# Patient Record
Sex: MALE | Race: WHITE | NOT HISPANIC OR LATINO | Employment: FULL TIME | URBAN - NONMETROPOLITAN AREA
[De-identification: names, ages, dates, MRNs, and addresses within clinical notes are randomized per-mention and may not be internally consistent; named-entity substitution may affect disease eponyms.]

---

## 2017-02-01 ENCOUNTER — OFFICE VISIT (OUTPATIENT)
Dept: INTERNAL MEDICINE | Facility: CLINIC | Age: 37
End: 2017-02-01

## 2017-02-01 VITALS
SYSTOLIC BLOOD PRESSURE: 140 MMHG | HEART RATE: 77 BPM | HEIGHT: 73 IN | DIASTOLIC BLOOD PRESSURE: 70 MMHG | BODY MASS INDEX: 27.22 KG/M2 | OXYGEN SATURATION: 97 % | TEMPERATURE: 98.5 F | WEIGHT: 205.4 LBS

## 2017-02-01 DIAGNOSIS — Z98.84 GASTRIC BYPASS STATUS FOR OBESITY: ICD-10-CM

## 2017-02-01 DIAGNOSIS — D50.8 OTHER IRON DEFICIENCY ANEMIA: Primary | ICD-10-CM

## 2017-02-01 DIAGNOSIS — R53.82 CHRONIC FATIGUE: ICD-10-CM

## 2017-02-01 LAB
25(OH)D3 SERPL-MCNC: 11.2 NG/ML
FERRITIN SERPL-MCNC: 8 NG/ML (ref 22–322)
FOLATE SERPL-MCNC: 5.88 NG/ML (ref 3.2–20)
IRON 24H UR-MRATE: 14 MCG/DL (ref 50–175)
IRON SATN MFR SERPL: 3 % (ref 20–50)
TIBC SERPL-MCNC: 464 MCG/DL (ref 250–450)
VIT B12 BLD-MCNC: 213 PG/ML (ref 211–911)

## 2017-02-01 PROCEDURE — 99213 OFFICE O/P EST LOW 20 MIN: CPT | Performed by: FAMILY MEDICINE

## 2017-02-01 PROCEDURE — 36415 COLL VENOUS BLD VENIPUNCTURE: CPT | Performed by: FAMILY MEDICINE

## 2017-02-01 PROCEDURE — 82746 ASSAY OF FOLIC ACID SERUM: CPT | Performed by: FAMILY MEDICINE

## 2017-02-01 PROCEDURE — 83550 IRON BINDING TEST: CPT | Performed by: FAMILY MEDICINE

## 2017-02-01 PROCEDURE — 82306 VITAMIN D 25 HYDROXY: CPT | Performed by: FAMILY MEDICINE

## 2017-02-01 PROCEDURE — 83540 ASSAY OF IRON: CPT | Performed by: FAMILY MEDICINE

## 2017-02-01 PROCEDURE — 82728 ASSAY OF FERRITIN: CPT | Performed by: FAMILY MEDICINE

## 2017-02-01 PROCEDURE — 82607 VITAMIN B-12: CPT | Performed by: FAMILY MEDICINE

## 2017-02-01 NOTE — PROGRESS NOTES
Subjective   Jose Martin Granda is a 37 y.o. male.     History of Present Illness   History of iron deficiency since gastric bypass in December 2004. Has lost >200 lb. Having fatigue despite iron supplement. interested in hematology referral for possible iron infusions if still low levels this check. Here for labs today.    The following portions of the patient's history were reviewed and updated as appropriate: allergies, current medications, past family history, past medical history, past social history, past surgical history and problem list.    Review of Systems   Constitutional: Positive for fatigue. Negative for unexpected weight change.       Objective   Physical Exam   Constitutional: He is oriented to person, place, and time. Vital signs are normal. He appears well-developed and well-nourished. He is active. He does not appear ill.   HENT:   Head: Normocephalic and atraumatic.   Right Ear: Hearing normal.   Left Ear: Hearing normal.   Nose: Nose normal.   Eyes: EOM and lids are normal. Pupils are equal, round, and reactive to light.   Cardiovascular: Normal rate, regular rhythm and normal heart sounds.    Pulmonary/Chest: Effort normal and breath sounds normal.   Neurological: He is alert and oriented to person, place, and time. No cranial nerve deficit. Gait normal.   Skin: Skin is warm. He is not diaphoretic. No cyanosis. Nails show no clubbing.   Psychiatric: He has a normal mood and affect. His speech is normal and behavior is normal. Judgment and thought content normal.   Nursing note and vitals reviewed.    Lab Results   Component Value Date    IRON 10 (L) 05/23/2016    TIBC 426 05/23/2016    FERRITIN 8.00 (L) 09/16/2016       Assessment/Plan   Jose Martin was seen today for iron deficiency anemia.    Diagnoses and all orders for this visit:    Other iron deficiency anemia  -     Iron Profile; Future  -     Ferritin; Future    Gastric bypass status for obesity  -     Iron Profile; Future  -     Ferritin; Future  -      Vitamin D 25 Hydroxy; Future  -     Vitamin B12 & Folate; Future    Chronic fatigue  -     Vitamin D 25 Hydroxy; Future  -     Vitamin B12 & Folate; Future

## 2017-02-02 DIAGNOSIS — E55.9 VITAMIN D DEFICIENCY: ICD-10-CM

## 2017-02-02 DIAGNOSIS — D50.9 IRON DEFICIENCY ANEMIA, UNSPECIFIED IRON DEFICIENCY ANEMIA TYPE: Primary | ICD-10-CM

## 2017-02-02 RX ORDER — ERGOCALCIFEROL 1.25 MG/1
50000 CAPSULE ORAL WEEKLY
Qty: 6 CAPSULE | Refills: 0 | Status: SHIPPED | OUTPATIENT
Start: 2017-02-02 | End: 2018-04-17

## 2017-02-03 ENCOUNTER — TELEPHONE (OUTPATIENT)
Dept: INTERNAL MEDICINE | Facility: CLINIC | Age: 37
End: 2017-02-03

## 2017-02-03 DIAGNOSIS — Z98.84 GASTRIC BYPASS STATUS FOR OBESITY: ICD-10-CM

## 2017-02-03 DIAGNOSIS — D50.9 IRON DEFICIENCY ANEMIA, UNSPECIFIED IRON DEFICIENCY ANEMIA TYPE: Primary | ICD-10-CM

## 2017-02-03 NOTE — TELEPHONE ENCOUNTER
----- Message from Lissy Alvarez sent at 2/3/2017 11:27 AM EST -----  THE PATIENT SAID HE HAD A MISSED CALL FROM DR. MATOS. HE CAN BE REACHED [phone].

## 2017-02-03 NOTE — TELEPHONE ENCOUNTER
PATIENT STATES HE GOT A MESSAGE SAYING DR. MATOS COULD SHULTZ THE ORDERS INSTEAD OF GOING TO HEMATOLOGIST

## 2017-02-06 RX ORDER — FERUMOXYTOL 510 MG/17ML
510 INJECTION INTRAVENOUS ONCE
Qty: 15.08 ML | Refills: 1 | Status: SHIPPED | OUTPATIENT
Start: 2017-02-06 | End: 2017-02-06

## 2017-02-06 NOTE — TELEPHONE ENCOUNTER
Please inform the patient that the order has been written and faxed over to outpatient infusions.  There are to contact him regarding scheduling.  Please have him call us if there's any questions.

## 2017-02-17 ENCOUNTER — APPOINTMENT (OUTPATIENT)
Dept: ONCOLOGY | Facility: HOSPITAL | Age: 37
End: 2017-02-17

## 2017-02-23 ENCOUNTER — CONSULT (OUTPATIENT)
Dept: ONCOLOGY | Facility: CLINIC | Age: 37
End: 2017-02-23

## 2017-02-23 VITALS
BODY MASS INDEX: 26.91 KG/M2 | TEMPERATURE: 98.4 F | SYSTOLIC BLOOD PRESSURE: 131 MMHG | WEIGHT: 204 LBS | DIASTOLIC BLOOD PRESSURE: 75 MMHG | HEART RATE: 73 BPM | RESPIRATION RATE: 18 BRPM

## 2017-02-23 DIAGNOSIS — Z98.84 GASTRIC BYPASS STATUS FOR OBESITY: ICD-10-CM

## 2017-02-23 DIAGNOSIS — D50.8 OTHER IRON DEFICIENCY ANEMIA: Primary | ICD-10-CM

## 2017-02-23 PROCEDURE — 99204 OFFICE O/P NEW MOD 45 MIN: CPT | Performed by: INTERNAL MEDICINE

## 2017-02-23 RX ORDER — SODIUM CHLORIDE 9 MG/ML
250 INJECTION, SOLUTION INTRAVENOUS ONCE
Status: CANCELLED | OUTPATIENT
Start: 2017-03-07

## 2017-02-23 RX ORDER — SODIUM CHLORIDE 9 MG/ML
250 INJECTION, SOLUTION INTRAVENOUS ONCE
Status: CANCELLED | OUTPATIENT
Start: 2017-02-28

## 2017-02-23 NOTE — PROGRESS NOTES
CHIEF COMPLAINT: Iron deficiency anemia    Problem List:  1.)  Iron deficiency anemia  2.)   b12 deficiency  3.)  History of Jasmyne-en-Y gastric bypass surgery 2004      HISTORY OF PRESENT ILLNESS:  The patient is a 37 y.o. male, here for follow up on management of iron deficiency anemia.  He's been on oral iron for many months and his ferritin is still staying quite low with hemoglobin in the 11's an MCV in the 70s.  He is generally fatigued.  He has 6 children.  He lost a lot of weight since his Jasmyne-en-Y surgery in December 2004 and is kept the weight off.  Other than for general fatigue he has no complaints.  He is not noted any change in the color caliber consistency of his stools though they stay dark when he is on his oral iron but he's off that now as it has not been effective in getting his iron levels up.      Past Medical History   Diagnosis Date   • Allergic 12/4/04     Aspirin and anti-inflammatories   • Erectile dysfunction    • Essential hypertension    • Hyperlipidemia    • Iron deficiency anemia      Past Surgical History   Procedure Laterality Date   • Gastric bypass     • Bariatric surgery  12/1/04     Ru en y gastric bypass   • Tonsillectomy       When I was a kid       Allergies   Allergen Reactions   • Anti-Inflammatory Enzyme [Nutritional Supplements]      Anti inflammatory meds     • Aspirin        Family History and Social History reviewed and changed as necessary      REVIEW OF SYSTEM:   Review of Systems   Constitutional: Negative for appetite change, chills, diaphoresis, fatigue, fever and unexpected weight change.   HENT:   Negative for mouth sores, sore throat and trouble swallowing.    Eyes: Negative for icterus.   Respiratory: Negative for cough, hemoptysis and shortness of breath.    Cardiovascular: Negative for chest pain, leg swelling and palpitations.   Gastrointestinal: Negative for abdominal distention, abdominal pain, blood in stool, constipation, diarrhea, nausea and vomiting.    Endocrine: Negative for hot flashes.   Genitourinary: Negative for bladder incontinence, difficulty urinating, dysuria, frequency and hematuria.    Musculoskeletal: Negative for gait problem, neck pain and neck stiffness.   Skin: Negative for rash.   Neurological: Negative for dizziness, gait problem, headaches, light-headedness and numbness.   Hematological: Negative for adenopathy. Does not bruise/bleed easily.   Psychiatric/Behavioral: Negative for depression. The patient is not nervous/anxious.    All other systems reviewed and are negative.       PHYSICAL EXAM    Vitals:    02/23/17 1416   BP: 131/75   Pulse: 73   Resp: 18   Temp: 98.4 °F (36.9 °C)   TempSrc: Temporal Artery    Weight: 204 lb (92.5 kg)     Constitutional: Appears well-developed and well-nourished. No distress.   ECOG: (1) Restricted in physically strenuous activity, ambulatory and able to do work of light nature  HENT:   Head: Normocephalic.   Mouth/Throat: Oropharynx is clear and moist.   Eyes: Conjunctivae are normal. Pupils are equal, round, and reactive to light. No scleral icterus.   Neck: Neck supple. No JVD present. No thyromegaly present.   Cardiovascular: Normal rate, regular rhythm and normal heart sounds.    Pulmonary/Chest: Breath sounds normal. No respiratory distress.   Abdominal: Soft. Exhibits no distension and no mass. There is no hepatosplenomegaly. There is no tenderness. There is no rebound and no guarding.   Musculoskeletal:Exhibits no edema, tenderness or deformity.   Neurological: Alert and oriented to person, place, and time. Exhibits normal muscle tone.   Skin: No ecchymosis, no petechiae and no rash noted. Not diaphoretic. No cyanosis. Nails show no clubbing.   Psychiatric: Normal mood and affect.   Vitals reviewed.      Office Visit on 02/01/2017   Component Date Value Ref Range Status   • Ferritin 02/01/2017 8.00* 22.00 - 322.00 ng/mL Final   • Iron 02/01/2017 14* 50 - 175 mcg/dL Final   • TIBC 02/01/2017 464* 250  - 450 mcg/dL Final   • Iron Saturation 02/01/2017 3* 20 - 50 % Final   • 25 Hydroxy, Vitamin D 02/01/2017 11.2  ng/ml Final   • Folate 02/01/2017 5.88  3.20 - 20.00 ng/mL Final   • Vitamin B-12 02/01/2017 213  211 - 911 pg/mL Final       Assessment/Plan     1.  Iron deficiency anemia  2. B12 deficiency  3. History of Jasmyne-en-Y for weight reduction    Discussion: He has iron malabsorption as well as B12 deficiency.  We will check his methylmalonic acid but he will get with Dr. Ralph to get a B12 shot monthly as it's easier to get in and out of his office than my new office.  We will give him day 1 and day 8 Feraheme.  Risks including anaphylaxis were discussed and he consents.  We will see him back in about 3 months with iron panel and CBC just prior to return.       Errors in dictation may reflect use of voice recognition software and not all errors in transcription may have been detected prior to signing.    Nicolas Chino MD    02/23/2017

## 2017-02-28 ENCOUNTER — INFUSION (OUTPATIENT)
Dept: ONCOLOGY | Facility: HOSPITAL | Age: 37
End: 2017-02-28

## 2017-02-28 VITALS
DIASTOLIC BLOOD PRESSURE: 82 MMHG | RESPIRATION RATE: 18 BRPM | TEMPERATURE: 98.4 F | SYSTOLIC BLOOD PRESSURE: 132 MMHG | HEART RATE: 80 BPM

## 2017-02-28 DIAGNOSIS — D50.8 OTHER IRON DEFICIENCY ANEMIA: ICD-10-CM

## 2017-02-28 DIAGNOSIS — Z98.84 GASTRIC BYPASS STATUS FOR OBESITY: Primary | ICD-10-CM

## 2017-02-28 LAB
BASOPHILS # BLD AUTO: 0.03 10*3/MM3 (ref 0–0.2)
BASOPHILS NFR BLD AUTO: 0.8 % (ref 0–2.5)
DEPRECATED RDW RBC AUTO: 46.6 FL (ref 37–54)
EOSINOPHIL # BLD AUTO: 0.09 10*3/MM3 (ref 0–0.7)
EOSINOPHIL NFR BLD AUTO: 2.5 % (ref 0–7)
ERYTHROCYTE [DISTWIDTH] IN BLOOD BY AUTOMATED COUNT: 17.5 % (ref 11.5–14.5)
HCT VFR BLD AUTO: 37.7 % (ref 42–52)
HGB BLD-MCNC: 11.4 G/DL (ref 14–18)
IMM GRANULOCYTES # BLD: 0.01 10*3/MM3 (ref 0–0.06)
IMM GRANULOCYTES NFR BLD: 0.3 % (ref 0–0.6)
LYMPHOCYTES # BLD AUTO: 1 10*3/MM3 (ref 0.6–3.4)
LYMPHOCYTES NFR BLD AUTO: 27.5 % (ref 10–50)
MCH RBC QN AUTO: 22.7 PG (ref 27–31)
MCHC RBC AUTO-ENTMCNC: 30.2 G/DL (ref 30–37)
MCV RBC AUTO: 75.1 FL (ref 80–94)
MONOCYTES # BLD AUTO: 0.47 10*3/MM3 (ref 0–0.9)
MONOCYTES NFR BLD AUTO: 12.9 % (ref 0–12)
NEUTROPHILS # BLD AUTO: 2.03 10*3/MM3 (ref 2–6.9)
NEUTROPHILS NFR BLD AUTO: 56 % (ref 37–80)
NRBC BLD MANUAL-RTO: 0 /100 WBC (ref 0–0)
PLATELET # BLD AUTO: 316 10*3/MM3 (ref 130–400)
PMV BLD AUTO: 9.9 FL (ref 6–12)
RBC # BLD AUTO: 5.02 10*6/MM3 (ref 4.7–6.1)
WBC NRBC COR # BLD: 3.63 10*3/MM3 (ref 4.8–10.8)

## 2017-02-28 PROCEDURE — 96374 THER/PROPH/DIAG INJ IV PUSH: CPT

## 2017-02-28 PROCEDURE — 25010000002 FERUMOXYTOL 510 MG/17ML SOLUTION 510 MG VIAL: Performed by: NURSE PRACTITIONER

## 2017-02-28 PROCEDURE — 36415 COLL VENOUS BLD VENIPUNCTURE: CPT

## 2017-02-28 RX ORDER — SODIUM CHLORIDE 9 MG/ML
250 INJECTION, SOLUTION INTRAVENOUS ONCE
Status: DISCONTINUED | OUTPATIENT
Start: 2017-02-28 | End: 2017-02-28 | Stop reason: HOSPADM

## 2017-02-28 RX ADMIN — FERUMOXYTOL 510 MG: 510 INJECTION INTRAVENOUS at 09:03

## 2017-03-03 LAB — METHYLMALONATE SERPL-SCNC: 140 NMOL/L (ref 0–378)

## 2017-03-07 ENCOUNTER — INFUSION (OUTPATIENT)
Dept: ONCOLOGY | Facility: HOSPITAL | Age: 37
End: 2017-03-07

## 2017-03-07 VITALS
RESPIRATION RATE: 18 BRPM | HEART RATE: 74 BPM | DIASTOLIC BLOOD PRESSURE: 53 MMHG | SYSTOLIC BLOOD PRESSURE: 108 MMHG | TEMPERATURE: 98.5 F

## 2017-03-07 DIAGNOSIS — Z98.84 GASTRIC BYPASS STATUS FOR OBESITY: Primary | ICD-10-CM

## 2017-03-07 DIAGNOSIS — D50.8 OTHER IRON DEFICIENCY ANEMIA: ICD-10-CM

## 2017-03-07 PROCEDURE — 25010000002 FERUMOXYTOL 510 MG/17ML SOLUTION 510 MG VIAL: Performed by: NURSE PRACTITIONER

## 2017-03-07 PROCEDURE — 96374 THER/PROPH/DIAG INJ IV PUSH: CPT

## 2017-03-07 RX ORDER — SODIUM CHLORIDE 9 MG/ML
250 INJECTION, SOLUTION INTRAVENOUS ONCE
Status: DISCONTINUED | OUTPATIENT
Start: 2017-03-07 | End: 2017-03-07 | Stop reason: HOSPADM

## 2017-03-07 RX ADMIN — FERUMOXYTOL 510 MG: 510 INJECTION INTRAVENOUS at 08:54

## 2017-04-05 ENCOUNTER — OFFICE VISIT (OUTPATIENT)
Dept: INTERNAL MEDICINE | Facility: CLINIC | Age: 37
End: 2017-04-05

## 2017-04-05 VITALS
HEART RATE: 83 BPM | OXYGEN SATURATION: 98 % | DIASTOLIC BLOOD PRESSURE: 70 MMHG | BODY MASS INDEX: 25.41 KG/M2 | WEIGHT: 198 LBS | SYSTOLIC BLOOD PRESSURE: 110 MMHG | HEIGHT: 74 IN

## 2017-04-05 DIAGNOSIS — D50.8 OTHER IRON DEFICIENCY ANEMIA: ICD-10-CM

## 2017-04-05 DIAGNOSIS — G47.09 OTHER INSOMNIA: Primary | ICD-10-CM

## 2017-04-05 DIAGNOSIS — E53.8 B12 DEFICIENCY: ICD-10-CM

## 2017-04-05 PROBLEM — G47.00 INSOMNIA: Status: ACTIVE | Noted: 2017-04-05

## 2017-04-05 LAB
BASOPHILS # BLD AUTO: 0.04 10*3/MM3 (ref 0–0.2)
BASOPHILS NFR BLD AUTO: 0.7 % (ref 0–2.5)
EOSINOPHIL # BLD AUTO: 0.03 10*3/MM3 (ref 0–0.7)
EOSINOPHIL NFR BLD AUTO: 0.5 % (ref 0–7)
ERYTHROCYTE [DISTWIDTH] IN BLOOD BY AUTOMATED COUNT: 25.2 % (ref 11.5–14.5)
FERRITIN SERPL-MCNC: 18.7 NG/ML (ref 17.9–464)
HCT VFR BLD AUTO: 45.5 % (ref 42–52)
HGB BLD-MCNC: 14.3 G/DL (ref 14–18)
IMM GRANULOCYTES # BLD: 0.01 10*3/MM3 (ref 0–0.06)
IMM GRANULOCYTES NFR BLD: 0.2 % (ref 0–0.6)
LYMPHOCYTES # BLD AUTO: 1.62 10*3/MM3 (ref 0.6–3.4)
LYMPHOCYTES NFR BLD AUTO: 29.1 % (ref 10–50)
MCH RBC QN AUTO: 26.6 PG (ref 27–31)
MCHC RBC AUTO-ENTMCNC: 31.4 G/DL (ref 30–37)
MCV RBC AUTO: 84.6 FL (ref 80–94)
MONOCYTES # BLD AUTO: 0.43 10*3/MM3 (ref 0–0.9)
MONOCYTES NFR BLD AUTO: 7.7 % (ref 0–12)
NEUTROPHILS # BLD AUTO: 3.43 10*3/MM3 (ref 2–6.9)
NEUTROPHILS NFR BLD AUTO: 61.8 % (ref 37–80)
NRBC BLD AUTO-RTO: 0 /100 WBC (ref 0–0)
PLATELET # BLD AUTO: 264 10*3/MM3 (ref 130–400)
RBC # BLD AUTO: 5.38 10*6/MM3 (ref 4.7–6.1)
WBC # BLD AUTO: 5.56 10*3/MM3 (ref 4.8–10.8)

## 2017-04-05 PROCEDURE — 99213 OFFICE O/P EST LOW 20 MIN: CPT | Performed by: FAMILY MEDICINE

## 2017-04-05 RX ORDER — CYANOCOBALAMIN 1000 UG/ML
1000 INJECTION, SOLUTION INTRAMUSCULAR; SUBCUTANEOUS
Status: DISCONTINUED | OUTPATIENT
Start: 2017-04-05 | End: 2018-05-07

## 2017-04-05 RX ORDER — TRAZODONE HYDROCHLORIDE 50 MG/1
TABLET ORAL
Qty: 60 TABLET | Refills: 11 | Status: SHIPPED | OUTPATIENT
Start: 2017-04-05 | End: 2017-08-16 | Stop reason: SDUPTHER

## 2017-04-05 NOTE — PROGRESS NOTES
"Subjective   Madhuri Granda is a 37 y.o. male.     History of Present Illness   Madhuri GOT HIS INFUSIONS OF THE FERAHEMe.  He states that the hematologist wants me to write him B12 shots instead of him.  He asks for trazodone to help him sleep.  He stopped drinking 2 weeks ago.  He has tried this before and slept well.      The following portions of the patient's history were reviewed and updated as appropriate: allergies, current medications, past social history and problem list.    Review of Systems   Constitutional: Negative for appetite change, chills, fatigue, fever and unexpected weight change.   HENT: Negative for congestion, ear pain, hearing loss, nosebleeds, postnasal drip, rhinorrhea, sore throat, tinnitus and trouble swallowing.    Eyes: Negative for photophobia, discharge and visual disturbance.   Respiratory: Negative for cough, chest tightness, shortness of breath and wheezing.    Cardiovascular: Negative for chest pain, palpitations and leg swelling.   Gastrointestinal: Negative for abdominal distention, abdominal pain, blood in stool, constipation, diarrhea, nausea and vomiting.   Endocrine: Negative for cold intolerance, heat intolerance, polydipsia, polyphagia and polyuria.   Musculoskeletal: Negative for arthralgias, back pain, joint swelling, myalgias, neck pain and neck stiffness.   Skin: Negative for color change, pallor, rash and wound.   Allergic/Immunologic: Negative for environmental allergies, food allergies and immunocompromised state.   Neurological: Negative for dizziness, tremors, seizures, weakness, numbness and headaches.   Hematological: Negative for adenopathy. Does not bruise/bleed easily.   Psychiatric/Behavioral: Negative for agitation, behavioral problems, confusion, hallucinations, self-injury and suicidal ideas. The patient is not nervous/anxious.        Objective   /70  Pulse 83  Ht 74\" (188 cm)  Wt 198 lb (89.8 kg)  SpO2 98%  BMI 25.42 kg/m2  Physical Exam "   Constitutional: He is oriented to person, place, and time. He appears well-developed and well-nourished. No distress.   HENT:   Head: Normocephalic and atraumatic.   Right Ear: External ear normal.   Left Ear: External ear normal.   Nose: Nose normal.   Mouth/Throat: Oropharynx is clear and moist.   Eyes: Conjunctivae and EOM are normal. Pupils are equal, round, and reactive to light. Right eye exhibits no discharge. Left eye exhibits no discharge. No scleral icterus.   Neck: Normal range of motion. Neck supple. No JVD present. No tracheal deviation present. No thyromegaly present.   Cardiovascular: Normal rate, regular rhythm, normal heart sounds and intact distal pulses.  Exam reveals no gallop and no friction rub.    No murmur heard.  Pulmonary/Chest: Effort normal and breath sounds normal. No stridor. No respiratory distress. He has no wheezes. He has no rales. He exhibits no tenderness.   Abdominal: Soft. Bowel sounds are normal. He exhibits no distension and no mass. There is no tenderness. There is no rebound and no guarding. No hernia.   Musculoskeletal: Normal range of motion. He exhibits no edema, tenderness or deformity.   Lymphadenopathy:     He has no cervical adenopathy.   Neurological: He is alert and oriented to person, place, and time. He has normal reflexes. He displays normal reflexes. No cranial nerve deficit. He exhibits normal muscle tone.   Skin: Skin is warm and dry. No rash noted. No erythema. No pallor.   Psychiatric: He has a normal mood and affect. His behavior is normal. Judgment normal.       Assessment/Plan   Problem List Items Addressed This Visit        Digestive    B12 deficiency    Relevant Medications    cyanocobalamin injection 1,000 mcg (Start on 4/5/2017  4:30 PM)       Other    Insomnia - Primary    Relevant Medications    traZODone (DESYREL) 50 MG tablet

## 2017-04-06 DIAGNOSIS — N52.8 OTHER MALE ERECTILE DYSFUNCTION: ICD-10-CM

## 2017-04-06 RX ORDER — TADALAFIL 20 MG/1
20 TABLET ORAL DAILY PRN
Qty: 6 TABLET | Refills: 11 | Status: SHIPPED | OUTPATIENT
Start: 2017-04-06 | End: 2017-07-05

## 2017-07-05 ENCOUNTER — OFFICE VISIT (OUTPATIENT)
Dept: INTERNAL MEDICINE | Facility: CLINIC | Age: 37
End: 2017-07-05

## 2017-07-05 VITALS
BODY MASS INDEX: 25.8 KG/M2 | HEART RATE: 72 BPM | OXYGEN SATURATION: 96 % | HEIGHT: 74 IN | DIASTOLIC BLOOD PRESSURE: 70 MMHG | WEIGHT: 201 LBS | SYSTOLIC BLOOD PRESSURE: 110 MMHG

## 2017-07-05 DIAGNOSIS — D50.8 OTHER IRON DEFICIENCY ANEMIA: ICD-10-CM

## 2017-07-05 DIAGNOSIS — E55.9 VITAMIN D DEFICIENCY: ICD-10-CM

## 2017-07-05 DIAGNOSIS — I10 ESSENTIAL HYPERTENSION: ICD-10-CM

## 2017-07-05 DIAGNOSIS — N52.9 ERECTILE DYSFUNCTION, UNSPECIFIED ERECTILE DYSFUNCTION TYPE: Primary | ICD-10-CM

## 2017-07-05 LAB
BASOPHILS # BLD AUTO: 0.03 10*3/MM3 (ref 0–0.2)
BASOPHILS NFR BLD AUTO: 0.8 % (ref 0–2.5)
EOSINOPHIL # BLD AUTO: 0.05 10*3/MM3 (ref 0–0.7)
EOSINOPHIL NFR BLD AUTO: 1.3 % (ref 0–7)
ERYTHROCYTE [DISTWIDTH] IN BLOOD BY AUTOMATED COUNT: 14.2 % (ref 11.5–14.5)
FERRITIN SERPL-MCNC: 10.6 NG/ML (ref 17.9–464)
HCT VFR BLD AUTO: 42.9 % (ref 42–52)
HGB BLD-MCNC: 13.9 G/DL (ref 14–18)
IMM GRANULOCYTES # BLD: 0.01 10*3/MM3 (ref 0–0.06)
IMM GRANULOCYTES NFR BLD: 0.3 % (ref 0–0.6)
LYMPHOCYTES # BLD AUTO: 1.32 10*3/MM3 (ref 0.6–3.4)
LYMPHOCYTES NFR BLD AUTO: 34.2 % (ref 10–50)
MCH RBC QN AUTO: 29.5 PG (ref 27–31)
MCHC RBC AUTO-ENTMCNC: 32.4 G/DL (ref 30–37)
MCV RBC AUTO: 91.1 FL (ref 80–94)
MONOCYTES # BLD AUTO: 0.55 10*3/MM3 (ref 0–0.9)
MONOCYTES NFR BLD AUTO: 14.2 % (ref 0–12)
NEUTROPHILS # BLD AUTO: 1.9 10*3/MM3 (ref 2–6.9)
NEUTROPHILS NFR BLD AUTO: 49.2 % (ref 37–80)
NRBC BLD AUTO-RTO: 0 /100 WBC (ref 0–0)
PLATELET # BLD AUTO: 225 10*3/MM3 (ref 130–400)
RBC # BLD AUTO: 4.71 10*6/MM3 (ref 4.7–6.1)
WBC # BLD AUTO: 3.86 10*3/MM3 (ref 4.8–10.8)

## 2017-07-05 PROCEDURE — 99213 OFFICE O/P EST LOW 20 MIN: CPT | Performed by: FAMILY MEDICINE

## 2017-07-05 NOTE — PROGRESS NOTES
"Subjective   Jose Martin Granda is a 37 y.o. male.     History of Present Illness   Jose Martin is doing well with the trazodone.  He's not had anything to drink but once in the whole month of June.  VSS.        The following portions of the patient's history were reviewed and updated as appropriate: allergies, current medications, past social history and problem list.    Review of Systems   Constitutional: Negative for appetite change, chills, fatigue, fever and unexpected weight change.   HENT: Negative for congestion, ear pain, hearing loss, nosebleeds, postnasal drip, rhinorrhea, sore throat, tinnitus and trouble swallowing.    Eyes: Negative for photophobia, discharge and visual disturbance.   Respiratory: Negative for cough, chest tightness, shortness of breath and wheezing.    Cardiovascular: Negative for chest pain, palpitations and leg swelling.   Gastrointestinal: Negative for abdominal distention, abdominal pain, blood in stool, constipation, diarrhea, nausea and vomiting.   Endocrine: Negative for cold intolerance, heat intolerance, polydipsia, polyphagia and polyuria.   Musculoskeletal: Negative for arthralgias, back pain, joint swelling, myalgias, neck pain and neck stiffness.   Skin: Negative for color change, pallor, rash and wound.   Allergic/Immunologic: Negative for environmental allergies, food allergies and immunocompromised state.   Neurological: Negative for dizziness, tremors, seizures, weakness, numbness and headaches.   Hematological: Negative for adenopathy. Does not bruise/bleed easily.   Psychiatric/Behavioral: Negative for agitation, behavioral problems, confusion, hallucinations, self-injury and suicidal ideas. The patient is not nervous/anxious.        Objective   /70  Pulse 72  Ht 74\" (188 cm)  Wt 201 lb (91.2 kg)  SpO2 96%  BMI 25.81 kg/m2  Physical Exam   Constitutional: He is oriented to person, place, and time. He appears well-developed and well-nourished. No distress.   HENT: "   Head: Normocephalic and atraumatic.   Right Ear: External ear normal.   Left Ear: External ear normal.   Nose: Nose normal.   Mouth/Throat: Oropharynx is clear and moist.   Eyes: Conjunctivae and EOM are normal. Pupils are equal, round, and reactive to light. Right eye exhibits no discharge. Left eye exhibits no discharge. No scleral icterus.   Neck: Normal range of motion. Neck supple. No JVD present. No tracheal deviation present. No thyromegaly present.   Cardiovascular: Normal rate, regular rhythm, normal heart sounds and intact distal pulses.  Exam reveals no gallop and no friction rub.    No murmur heard.  Pulmonary/Chest: Effort normal and breath sounds normal. No stridor. No respiratory distress. He has no wheezes. He has no rales. He exhibits no tenderness.   Abdominal: Soft. Bowel sounds are normal. He exhibits no distension and no mass. There is no tenderness. There is no rebound and no guarding. No hernia.   Musculoskeletal: Normal range of motion. He exhibits no edema, tenderness or deformity.   Lymphadenopathy:     He has no cervical adenopathy.   Neurological: He is alert and oriented to person, place, and time. He has normal reflexes. He displays normal reflexes. No cranial nerve deficit. He exhibits normal muscle tone.   Skin: Skin is warm and dry. No rash noted. No erythema. No pallor.   Psychiatric: He has a normal mood and affect. His behavior is normal. Judgment normal.       Assessment/Plan   Problem List Items Addressed This Visit        Cardiovascular and Mediastinum    Essential hypertension       Digestive    Vitamin D deficiency       Genitourinary    Erectile dysfunction - Primary    Relevant Orders    Ambulatory Referral to Urology       Hematopoietic and Hemostatic    Iron deficiency anemia    Relevant Orders    CBC & Differential    Ferritin           check labs and see if he needs the fereheme infusion again.

## 2017-07-21 RX ORDER — FERUMOXYTOL 510 MG/17ML
510 INJECTION INTRAVENOUS ONCE
Qty: 15.08 ML | Refills: 0 | Status: SHIPPED | OUTPATIENT
Start: 2017-07-21 | End: 2017-07-21

## 2017-07-26 ENCOUNTER — TRANSCRIBE ORDERS (OUTPATIENT)
Dept: INFUSION THERAPY | Facility: HOSPITAL | Age: 37
End: 2017-07-26

## 2017-08-16 ENCOUNTER — OFFICE VISIT (OUTPATIENT)
Dept: INTERNAL MEDICINE | Facility: CLINIC | Age: 37
End: 2017-08-16

## 2017-08-16 VITALS
SYSTOLIC BLOOD PRESSURE: 120 MMHG | OXYGEN SATURATION: 98 % | HEART RATE: 83 BPM | HEIGHT: 74 IN | BODY MASS INDEX: 25.15 KG/M2 | DIASTOLIC BLOOD PRESSURE: 80 MMHG | WEIGHT: 196 LBS

## 2017-08-16 DIAGNOSIS — B37.2 CUTANEOUS CANDIDIASIS: ICD-10-CM

## 2017-08-16 DIAGNOSIS — G47.09 OTHER INSOMNIA: ICD-10-CM

## 2017-08-16 DIAGNOSIS — D50.8 OTHER IRON DEFICIENCY ANEMIA: Primary | ICD-10-CM

## 2017-08-16 PROCEDURE — 99214 OFFICE O/P EST MOD 30 MIN: CPT | Performed by: FAMILY MEDICINE

## 2017-08-16 RX ORDER — FLUCONAZOLE 150 MG/1
150 TABLET ORAL EVERY OTHER DAY
Qty: 5 TABLET | Refills: 5 | Status: SHIPPED | OUTPATIENT
Start: 2017-08-16 | End: 2017-08-25

## 2017-08-16 RX ORDER — TRAZODONE HYDROCHLORIDE 50 MG/1
TABLET ORAL
Qty: 180 TABLET | Refills: 3 | Status: SHIPPED | OUTPATIENT
Start: 2017-08-16 | End: 2018-04-17 | Stop reason: SDUPTHER

## 2017-08-16 NOTE — PROGRESS NOTES
"Subjective   Jose Martin Granda is a 37 y.o. male.     History of Present Illness   Jose Martin had his labs last month that showed a ferritin of 10.  His CBC and indices were all fine.  He has been taking the venofur injections through outpatient.  He needs trazodone changed to a 90 day rx.    He has a rash on his chest that has come and gone for years.        The following portions of the patient's history were reviewed and updated as appropriate: allergies, current medications, past social history and problem list.    Review of Systems   Constitutional: Negative for appetite change, chills, fatigue, fever and unexpected weight change.   HENT: Negative for congestion, ear pain, hearing loss, nosebleeds, postnasal drip, rhinorrhea, sore throat, tinnitus and trouble swallowing.    Eyes: Negative for photophobia, discharge and visual disturbance.   Respiratory: Negative for cough, chest tightness, shortness of breath and wheezing.    Cardiovascular: Negative for chest pain, palpitations and leg swelling.   Gastrointestinal: Negative for abdominal distention, abdominal pain, blood in stool, constipation, diarrhea, nausea and vomiting.   Endocrine: Negative for cold intolerance, heat intolerance, polydipsia, polyphagia and polyuria.   Musculoskeletal: Negative for arthralgias, back pain, joint swelling, myalgias, neck pain and neck stiffness.   Skin: Positive for rash. Negative for color change, pallor and wound.   Allergic/Immunologic: Negative for environmental allergies, food allergies and immunocompromised state.   Neurological: Negative for dizziness, tremors, seizures, weakness, numbness and headaches.   Hematological: Negative for adenopathy. Does not bruise/bleed easily.   Psychiatric/Behavioral: Negative for agitation, behavioral problems, confusion, hallucinations, self-injury and suicidal ideas. The patient is not nervous/anxious.        Objective   /80  Pulse 83  Ht 74\" (188 cm)  Wt 196 lb (88.9 kg)  SpO2 98%  " BMI 25.16 kg/m2  Physical Exam   Constitutional: He is oriented to person, place, and time. He appears well-developed and well-nourished. No distress.   HENT:   Head: Normocephalic and atraumatic.   Right Ear: External ear normal.   Left Ear: External ear normal.   Nose: Nose normal.   Mouth/Throat: Oropharynx is clear and moist.   Eyes: Conjunctivae and EOM are normal. Pupils are equal, round, and reactive to light. Right eye exhibits no discharge. Left eye exhibits no discharge. No scleral icterus.   Neck: Normal range of motion. Neck supple. No JVD present. No tracheal deviation present. No thyromegaly present.   Cardiovascular: Normal rate, regular rhythm, normal heart sounds and intact distal pulses.  Exam reveals no gallop and no friction rub.    No murmur heard.  Pulmonary/Chest: Effort normal and breath sounds normal. No stridor. No respiratory distress. He has no wheezes. He has no rales. He exhibits no tenderness.   Abdominal: Soft. Bowel sounds are normal. He exhibits no distension and no mass. There is no tenderness. There is no rebound and no guarding. No hernia.   Musculoskeletal: Normal range of motion. He exhibits no edema, tenderness or deformity.   Lymphadenopathy:     He has no cervical adenopathy.   Neurological: He is alert and oriented to person, place, and time. He has normal reflexes. He displays normal reflexes. No cranial nerve deficit. He exhibits normal muscle tone.   Skin: Skin is warm and dry. No rash noted. No erythema. No pallor.   Cutaneous candidiasis and tinea corporis noted on chest on exam.   Psychiatric: He has a normal mood and affect. His behavior is normal. Judgment normal.       Assessment/Plan   Problem List Items Addressed This Visit        Hematopoietic and Hemostatic    Iron deficiency anemia - Primary    Relevant Medications    sodium chloride 0.9 % solution 100 mL with ferumoxytol 510 MG/17ML solution 510 mg    Other Relevant Orders    Ambulatory Referral to Infusion  Treatment       Other    Insomnia    Relevant Medications    traZODone (DESYREL) 50 MG tablet      Other Visit Diagnoses     Cutaneous candidiasis        Relevant Medications    fluconazole (DIFLUCAN) 150 MG tablet        Fu in 2 omnths.

## 2017-08-17 ENCOUNTER — OFFICE VISIT (OUTPATIENT)
Dept: UROLOGY | Facility: CLINIC | Age: 37
End: 2017-08-17

## 2017-08-17 VITALS
HEART RATE: 79 BPM | BODY MASS INDEX: 24.64 KG/M2 | SYSTOLIC BLOOD PRESSURE: 120 MMHG | HEIGHT: 74 IN | OXYGEN SATURATION: 98 % | DIASTOLIC BLOOD PRESSURE: 70 MMHG | WEIGHT: 192 LBS | TEMPERATURE: 98.6 F

## 2017-08-17 DIAGNOSIS — N52.9 ERECTILE DYSFUNCTION, UNSPECIFIED ERECTILE DYSFUNCTION TYPE: Primary | ICD-10-CM

## 2017-08-17 DIAGNOSIS — D50.8 OTHER IRON DEFICIENCY ANEMIA: ICD-10-CM

## 2017-08-17 DIAGNOSIS — Z80.42 FAMILY HISTORY OF PROSTATE CANCER: ICD-10-CM

## 2017-08-17 DIAGNOSIS — F32.89 OTHER DEPRESSION: ICD-10-CM

## 2017-08-17 DIAGNOSIS — E29.1 HYPOGONADISM MALE: ICD-10-CM

## 2017-08-17 LAB
BILIRUB BLD-MCNC: NEGATIVE MG/DL
CLARITY, POC: CLEAR
COLOR UR: YELLOW
GLUCOSE UR STRIP-MCNC: NEGATIVE MG/DL
KETONES UR QL: NEGATIVE
LEUKOCYTE EST, POC: NEGATIVE
NITRITE UR-MCNC: NEGATIVE MG/ML
PH UR: 6.5 [PH] (ref 5–8)
PROT UR STRIP-MCNC: NEGATIVE MG/DL
RBC # UR STRIP: NEGATIVE /UL
SP GR UR: 1.02 (ref 1–1.03)
UROBILINOGEN UR QL: ABNORMAL

## 2017-08-17 PROCEDURE — 99204 OFFICE O/P NEW MOD 45 MIN: CPT | Performed by: UROLOGY

## 2017-08-17 PROCEDURE — 81003 URINALYSIS AUTO W/O SCOPE: CPT | Performed by: UROLOGY

## 2017-08-17 NOTE — PROGRESS NOTES
Chief Complaint  Erectile Dysfunction        RENE Granda is a 37 y.o. male who is referred for evaluation for erectile dysfunction that's been near total for 1 year.  He's had a few erections for masturbation in the intervening time but currently his libido is extremely low as well.  He had a gastric bypass with a tremendous weight loss years ago and is currently being treated for anemia secondary to iron deficiency.  He has symptoms of hypogonadism with extreme fatigue and lack of strength lethargy lack of stamina and this could be due to the anemia but also could indicate a low testosterone level.  He has a past history of significant hypertension that was never treated until he had his weight loss surgery and now he doesn't need to be treated.  I suspect atherosclerosis remains however and is the leading contender for the etiology of his erectile dysfunction.  The patient is informed that the most common cause of erectile dysfunction is insufficient blood flow.  Atherosclerosis is extremely common in Americans who eat a diet high in animal fat and get insufficient exercise.  Patients who have hyperlipidemia and smoke cigarettes compound this affect.  The concept of diminished inflow from arterial sclerosis as well as diminished effectiveness of venous polsters that retain the blood in the cavernous spaces are presented to the patient.  The need for smoking cessation and eating a heart healthy diet along with increased physical activity is recommended in addition to the specific treatments for erectile dysfunction and hyperlipidemia.  He is not a tobacco user but does drink excessive alcohol and we'll need to curtail this.  He does have a family history of prostate cancer in several relatives including his father.    Vitals:    08/17/17 1515   BP: 120/70   Pulse: 79   Temp: 98.6 °F (37 °C)   SpO2: 98%       Past Medical History  Past Medical History:   Diagnosis Date   • Allergic 12/4/04    Aspirin and  anti-inflammatories   • Erectile dysfunction    • Essential hypertension    • Hyperlipidemia    • Iron deficiency anemia        Past Surgical History  Past Surgical History:   Procedure Laterality Date   • BARIATRIC SURGERY  12/1/04    Ru en y gastric bypass   • GASTRIC BYPASS     • TONSILLECTOMY      When I was a kid       Medications  has a current medication list which includes the following prescription(s): azelastine, fluconazole, trazodone, and vitamin d, and the following Facility-Administered Medications: cyanocobalamin.      Allergies  Allergies   Allergen Reactions   • Anti-Inflammatory Enzyme [Nutritional Supplements]      Anti inflammatory meds     • Aspirin        Social History  Social History     Social History Narrative       Family History  He has no family history of bladder or kidney cancer  He has no family history of kidney stones      AUA Symptom Score:      Review of Systems  Review of Systems  Positive for feeling tired having problems swallowing feeling depressed but negative and other categories.  Physical Exam  Physical Exam   Constitutional: He is oriented to person, place, and time. He appears well-developed and well-nourished.   HENT:   Head: Normocephalic and atraumatic.   Neck: Normal range of motion.   Pulmonary/Chest: Effort normal. No respiratory distress.   Abdominal: Soft. He exhibits no distension and no mass. There is no tenderness. No hernia.   Genitourinary: Rectum normal, prostate normal and penis normal.   Musculoskeletal: Normal range of motion.   Lymphadenopathy:     He has no cervical adenopathy.   Neurological: He is alert and oriented to person, place, and time.   Skin: Skin is warm and dry.   Psychiatric: He has a normal mood and affect. His behavior is normal.   Vitals reviewed.      Labs Recent and today in the office:  Results for orders placed or performed in visit on 07/05/17   Ferritin   Result Value Ref Range    Ferritin 10.60 (L) 17.90 - 464.00 ng/mL   CBC &  Differential   Result Value Ref Range    WBC 3.86 (L) 4.80 - 10.80 10*3/mm3    RBC 4.71 4.70 - 6.10 10*6/mm3    Hemoglobin 13.9 (L) 14.0 - 18.0 g/dL    Hematocrit 42.9 42.0 - 52.0 %    MCV 91.1 80.0 - 94.0 fL    MCH 29.5 27.0 - 31.0 pg    MCHC 32.4 30.0 - 37.0 g/dL    RDW 14.2 11.5 - 14.5 %    Platelets 225 130 - 400 10*3/mm3    Neutrophil Rel % 49.2 37.0 - 80.0 %    Lymphocyte Rel % 34.2 10.0 - 50.0 %    Monocyte Rel % 14.2 (H) 0.0 - 12.0 %    Eosinophil Rel % 1.3 0.0 - 7.0 %    Basophil Rel % 0.8 0.0 - 2.5 %    Neutrophils Absolute 1.90 (L) 2.00 - 6.90 10*3/mm3    Lymphocytes Absolute 1.32 0.60 - 3.40 10*3/mm3    Monocytes Absolute 0.55 0.00 - 0.90 10*3/mm3    Eosinophils Absolute 0.05 0.00 - 0.70 10*3/mm3    Basophils Absolute 0.03 0.00 - 0.20 10*3/mm3    Immature Granulocyte Rel % 0.3 0.0 - 0.6 %    Immature Grans Absolute 0.01 0.00 - 0.06 10*3/mm3    nRBC 0.0 0.0 - 0.0 /100 WBC         Assessment & Plan  The treatment options for erectile dysfunction are then reviewed in detail with the patient.  These include oral medications including several different brands and several different dosages.  The second option to consider is intraurethral suppositories named Trail Inserts that is basically alprostadil absorbed through the urethra.  The third option is the external erection device that can be purchased through a Rep w can see the patient here in my office and present numerous models that range from relatively inexpensive to fancy and expensive.  The fourth option is intracorporeal injection using combination of 3 medications including phentolamine alprostadil and papaverine.  This is so-called Trimix and is more effective than the above measures but does require an injection with each episode of intercourse.  This does carry an increased risk of bleeding or ecchymosis on patient's that are using aspirin or blood thinner.  The last option which is mentioned only to be complete is surgical implantation of a penile  prosthesis.  These are expensive and have lead to a unsatisfactory results in that the patient is not assured achieving orgasm and sexual partners have complained about a cold sensation.    We will  recommend the first treatment option and progress as needed according to the patient's desires.  He is provided with Viagra 100 mg samples along with discount coupons and will return in 2 weeks after the results of the serological analysis.

## 2017-08-18 ENCOUNTER — TELEPHONE (OUTPATIENT)
Dept: INTERNAL MEDICINE | Facility: CLINIC | Age: 37
End: 2017-08-18

## 2017-08-18 NOTE — TELEPHONE ENCOUNTER
LEFT DETAILED VM [phone]. THE INFUSION HAS BEEN SCHEDULED FOR 8/24/17 @ 1:00 THIS WILL TAKE 90 MINUTES. I ADVISED THE PATIENT CALL CENTRAL SCHEDULING IF THIS TIME DOES NOT WORK FOR HIM.

## 2017-08-20 LAB
ESTRADIOL SERPL-MCNC: 31.2 PG/ML (ref 7.6–42.6)
LH SERPL-ACNC: 4.2 MIU/ML (ref 1.7–8.6)
PROLACTIN SERPL-MCNC: 7 NG/ML (ref 4–15.2)
PSA SERPL-MCNC: 1.25 NG/ML (ref 0.06–4)
TESTOST FREE SERPL-MCNC: 8.9 PG/ML (ref 8.7–25.1)
TESTOST SERPL-MCNC: 615 NG/DL (ref 264–916)

## 2017-08-23 ENCOUNTER — HOSPITAL ENCOUNTER (OUTPATIENT)
Dept: INFUSION THERAPY | Facility: HOSPITAL | Age: 37
Setting detail: INFUSION SERIES
Discharge: HOME | End: 2017-08-23

## 2017-08-23 VITALS
DIASTOLIC BLOOD PRESSURE: 69 MMHG | TEMPERATURE: 97.4 F | OXYGEN SATURATION: 99 % | HEIGHT: 74 IN | BODY MASS INDEX: 25.03 KG/M2 | SYSTOLIC BLOOD PRESSURE: 113 MMHG | RESPIRATION RATE: 18 BRPM | WEIGHT: 195 LBS | HEART RATE: 78 BPM

## 2017-08-23 DIAGNOSIS — D50.8 OTHER IRON DEFICIENCY ANEMIA: ICD-10-CM

## 2017-08-23 PROCEDURE — 96374 THER/PROPH/DIAG INJ IV PUSH: CPT

## 2017-08-23 PROCEDURE — 25010000002 FERUMOXYTOL 510 MG/17ML SOLUTION 510 MG VIAL: Performed by: FAMILY MEDICINE

## 2017-08-23 PROCEDURE — 96361 HYDRATE IV INFUSION ADD-ON: CPT

## 2017-08-23 RX ORDER — SODIUM CHLORIDE 9 MG/ML
250 INJECTION, SOLUTION INTRAVENOUS ONCE
Status: CANCELLED | OUTPATIENT
Start: 2017-08-23

## 2017-08-23 RX ORDER — SODIUM CHLORIDE 9 MG/ML
250 INJECTION, SOLUTION INTRAVENOUS ONCE
Status: COMPLETED | OUTPATIENT
Start: 2017-08-23 | End: 2017-08-23

## 2017-08-23 RX ADMIN — SODIUM CHLORIDE 250 ML: 900 INJECTION, SOLUTION INTRAVENOUS at 14:22

## 2017-08-23 RX ADMIN — FERUMOXYTOL 510 MG: 510 INJECTION INTRAVENOUS at 14:21

## 2017-08-24 ENCOUNTER — APPOINTMENT (OUTPATIENT)
Dept: INFUSION THERAPY | Facility: HOSPITAL | Age: 37
End: 2017-08-24

## 2017-09-07 ENCOUNTER — OFFICE VISIT (OUTPATIENT)
Dept: UROLOGY | Facility: CLINIC | Age: 37
End: 2017-09-07

## 2017-09-07 DIAGNOSIS — N52.9 ERECTILE DYSFUNCTION, UNSPECIFIED ERECTILE DYSFUNCTION TYPE: Primary | ICD-10-CM

## 2017-09-07 LAB
BILIRUB BLD-MCNC: NEGATIVE MG/DL
CLARITY, POC: CLEAR
COLOR UR: YELLOW
GLUCOSE UR STRIP-MCNC: NEGATIVE MG/DL
KETONES UR QL: NEGATIVE
LEUKOCYTE EST, POC: NEGATIVE
NITRITE UR-MCNC: NEGATIVE MG/ML
PH UR: 6.5 [PH] (ref 5–8)
PROT UR STRIP-MCNC: NEGATIVE MG/DL
RBC # UR STRIP: NEGATIVE /UL
SP GR UR: 1.01 (ref 1–1.03)
UROBILINOGEN UR QL: NORMAL

## 2017-09-07 PROCEDURE — 81003 URINALYSIS AUTO W/O SCOPE: CPT | Performed by: UROLOGY

## 2017-09-07 PROCEDURE — 99214 OFFICE O/P EST MOD 30 MIN: CPT | Performed by: UROLOGY

## 2017-09-07 NOTE — PROGRESS NOTES
Chief Complaint  Erectile Dysfunction (2 weeks fup.)        RENE Granda is a 37 y.o. male who returns today for further evaluation of his erectile dysfunction and hypogonadism.  He's had gastric bypass surgery with profound weight loss but has developed some difficulty swallowing and get choked on the Viagra he was provided on the last visit.  He states he can't eat a piece of meat unless it is very small and apparently this is common after a bypass.  Obviously I would recommend thoracic surgery evaluation of a possible esophageal stricture.  With his symptoms of decreased strength stamina extreme fatigue anemia and loss of libido I suspected a low testosterone level but his hormonal evaluation was within normal limits.  He hasn't had a good trial with the Viagra since he gets choked on the samples but I have suggested sublingual lozenges.    There were no vitals filed for this visit.    Past Medical History  Past Medical History:   Diagnosis Date   • Allergic 12/4/04    Aspirin and anti-inflammatories   • Erectile dysfunction    • Essential hypertension     not now   • Hyperlipidemia    • Iron deficiency anemia     gastric by pass       Past Surgical History  Past Surgical History:   Procedure Laterality Date   • BARIATRIC SURGERY  12/1/04    Ru en y gastric bypass   • GASTRIC BYPASS     • TONSILLECTOMY      When I was a kid       Medications  has a current medication list which includes the following prescription(s): azelastine, trazodone, and vitamin d, and the following Facility-Administered Medications: cyanocobalamin.      Allergies  Allergies   Allergen Reactions   • Anti-Inflammatory Enzyme [Nutritional Supplements]      Anti inflammatory meds     • Aspirin        Social History  Social History     Social History Narrative       Family History  He has no family history of bladder or kidney cancer  He has no family history of kidney stones      AUA Symptom Score:      Review of Systems  Review of Systems    Respiratory: Positive for choking.    Genitourinary: Negative.    All other systems reviewed and are negative.      Physical Exam  Physical Exam    Labs Recent and today in the office:  Results for orders placed or performed in visit on 09/07/17   POC Urinalysis Dipstick, Automated   Result Value Ref Range    Color Yellow Yellow, Straw, Dark Yellow, Courtney    Clarity, UA Clear Clear    Glucose, UA Negative Negative, 1000 mg/dL (3+) mg/dL    Bilirubin Negative Negative    Ketones, UA Negative Negative    Specific Gravity  1.015 1.005 - 1.030    Blood, UA Negative Negative    pH, Urine 6.5 5.0 - 8.0    Protein, POC Negative Negative mg/dL    Urobilinogen, UA Normal Normal    Leukocytes Negative Negative    Nitrite, UA Negative Negative         Assessment & Plan  In addition to sublingual lozenges I've recommended an external erection device but he wants to wait now for financial reasons.  I've also recommended referral to sexual counseling with Dr. Chatterjee since he has a history of pornography addiction and probably has a psychological component going on.

## 2018-01-31 ENCOUNTER — OFFICE VISIT (OUTPATIENT)
Dept: UROLOGY | Facility: CLINIC | Age: 38
End: 2018-01-31

## 2018-01-31 VITALS
WEIGHT: 195 LBS | DIASTOLIC BLOOD PRESSURE: 71 MMHG | OXYGEN SATURATION: 97 % | HEIGHT: 74 IN | SYSTOLIC BLOOD PRESSURE: 128 MMHG | RESPIRATION RATE: 16 BRPM | BODY MASS INDEX: 25.03 KG/M2 | TEMPERATURE: 98.2 F | HEART RATE: 74 BPM

## 2018-01-31 DIAGNOSIS — N52.9 ERECTILE DYSFUNCTION, UNSPECIFIED ERECTILE DYSFUNCTION TYPE: Primary | ICD-10-CM

## 2018-01-31 LAB
BILIRUB BLD-MCNC: NEGATIVE MG/DL
CLARITY, POC: CLEAR
COLOR UR: YELLOW
GLUCOSE UR STRIP-MCNC: NEGATIVE MG/DL
KETONES UR QL: NEGATIVE
LEUKOCYTE EST, POC: NEGATIVE
NITRITE UR-MCNC: NEGATIVE MG/ML
PH UR: 7 [PH] (ref 5–8)
PROT UR STRIP-MCNC: NEGATIVE MG/DL
RBC # UR STRIP: NEGATIVE /UL
SP GR UR: 1.02 (ref 1–1.03)
UROBILINOGEN UR QL: NORMAL

## 2018-01-31 PROCEDURE — 99214 OFFICE O/P EST MOD 30 MIN: CPT | Performed by: UROLOGY

## 2018-01-31 PROCEDURE — 81003 URINALYSIS AUTO W/O SCOPE: CPT | Performed by: UROLOGY

## 2018-01-31 NOTE — PROGRESS NOTES
Chief Complaint  Erectile Dysfunction (fuu 1 month)        RENE Granda is a 38 y.o. male who returns today for further evaluation and treatment of erectile dysfunction.  His hormonal evaluation revealed normal testosterone level.  He admits that he has a lot of psychological issues.  He brings with him his wife today who is happy to help him with the problem.  He has difficulty swallowing, peel after his bypass surgery but has been able to use the sildenafil lozenges with minor improvement.  He is also obtain an artificial erection device although he did not get it through our office.    Vitals:    01/31/18 1421   BP: 128/71   Pulse: 74   Resp: 16   Temp: 98.2 °F (36.8 °C)   SpO2: 97%       Past Medical History  Past Medical History:   Diagnosis Date   • Allergic 12/4/04    Aspirin and anti-inflammatories   • Erectile dysfunction    • Essential hypertension     not now   • Hyperlipidemia    • Iron deficiency anemia     gastric by pass       Past Surgical History  Past Surgical History:   Procedure Laterality Date   • BARIATRIC SURGERY  12/1/04    Ru en y gastric bypass   • GASTRIC BYPASS     • TONSILLECTOMY      When I was a kid       Medications  has a current medication list which includes the following prescription(s): azelastine, trazodone, and vitamin d, and the following Facility-Administered Medications: cyanocobalamin.      Allergies  Allergies   Allergen Reactions   • Anti-Inflammatory Enzyme [Nutritional Supplements]      Anti inflammatory meds     • Aspirin        Social History  Social History     Social History Narrative       Family History  He has no family history of bladder or kidney cancer  He has no family history of kidney stones      AUA Symptom Score:      Review of Systems  Review of Systems   Constitutional: Negative.    Genitourinary: Negative.    All other systems reviewed and are negative.      Physical Exam  Physical Exam    Labs Recent and today in the office:  Results for orders placed  or performed in visit on 01/31/18   POC Urinalysis Dipstick, Automated   Result Value Ref Range    Color Yellow Yellow, Straw, Dark Yellow, Courtney    Clarity, UA Clear Clear    Glucose, UA Negative Negative, 1000 mg/dL (3+) mg/dL    Bilirubin Negative Negative    Ketones, UA Negative Negative    Specific Gravity  1.020 1.005 - 1.030    Blood, UA Negative Negative    pH, Urine 7.0 5.0 - 8.0    Protein, POC Negative Negative mg/dL    Urobilinogen, UA Normal Normal    Leukocytes Negative Negative    Nitrite, UA Negative Negative         Assessment & Plan  Erectile dysfunction: 25 minutes is spent counseling the patient and his wife explaining various treatment alternatives for ED.  This point I have recommended combining the oral medications of sildenafil lozenges with the artificial erection device.  If this is not effective I've recommended intracorporeal injection with Trimix.  This information along with a brochure is explained to the patient and his wife and I've encouraged him to order the medication and then come in for demonstration on injection.  We have also discussed the possibility of insertion of a penile prosthesis along with the risks expands and drawbacks.

## 2018-04-17 ENCOUNTER — OFFICE VISIT (OUTPATIENT)
Dept: INTERNAL MEDICINE | Facility: CLINIC | Age: 38
End: 2018-04-17

## 2018-04-17 VITALS
HEART RATE: 66 BPM | SYSTOLIC BLOOD PRESSURE: 110 MMHG | WEIGHT: 195.25 LBS | TEMPERATURE: 98.2 F | HEIGHT: 74 IN | BODY MASS INDEX: 25.06 KG/M2 | DIASTOLIC BLOOD PRESSURE: 78 MMHG | OXYGEN SATURATION: 97 %

## 2018-04-17 DIAGNOSIS — F51.04 PSYCHOPHYSIOLOGICAL INSOMNIA: ICD-10-CM

## 2018-04-17 DIAGNOSIS — R53.82 CHRONIC FATIGUE: ICD-10-CM

## 2018-04-17 DIAGNOSIS — D50.9 IRON DEFICIENCY ANEMIA, UNSPECIFIED IRON DEFICIENCY ANEMIA TYPE: ICD-10-CM

## 2018-04-17 DIAGNOSIS — E55.9 VITAMIN D DEFICIENCY: ICD-10-CM

## 2018-04-17 DIAGNOSIS — L21.9 SEBORRHEIC DERMATITIS: ICD-10-CM

## 2018-04-17 DIAGNOSIS — R79.89 LOW TESTOSTERONE IN MALE: Primary | ICD-10-CM

## 2018-04-17 DIAGNOSIS — Z98.84 GASTRIC BYPASS STATUS FOR OBESITY: ICD-10-CM

## 2018-04-17 DIAGNOSIS — E53.8 B12 DEFICIENCY: ICD-10-CM

## 2018-04-17 PROCEDURE — 99214 OFFICE O/P EST MOD 30 MIN: CPT | Performed by: FAMILY MEDICINE

## 2018-04-17 RX ORDER — KETOCONAZOLE 20 MG/ML
SHAMPOO TOPICAL 2 TIMES WEEKLY
Qty: 120 ML | Refills: 2 | Status: SHIPPED | OUTPATIENT
Start: 2018-04-19

## 2018-04-17 RX ORDER — KETOCONAZOLE 20 MG/G
CREAM TOPICAL DAILY
Qty: 60 G | Refills: 2 | Status: SHIPPED | OUTPATIENT
Start: 2018-04-17 | End: 2018-06-18

## 2018-04-17 RX ORDER — TRAZODONE HYDROCHLORIDE 50 MG/1
TABLET ORAL
Qty: 180 TABLET | Refills: 3 | Status: SHIPPED | OUTPATIENT
Start: 2018-04-17

## 2018-04-17 NOTE — PROGRESS NOTES
SUBJECTIVE: Jose Martin Granda is a 38 y.o. male seen for an establish care visit;      Chronic fatigue: S/p gastric bypass several years ago.  Has continued to be iron deficient and b12 deficient since.  He has had iron infusions before and did not really see much improvement with his ferritin or energy with that.  He Is also c/o ED issues and is concerned that his testosterone is low.      Rash: on chest, has flaking, itching occasional redness.  Has tired OTC fungal creams w/o any improvement.       The following portions of the patient's history were reviewed and updated as appropriate: He  has a past medical history of Allergic (12/4/04); Erectile dysfunction; Essential hypertension; Hyperlipidemia; and Iron deficiency anemia.  He has Iron deficiency anemia; Gastric bypass status for obesity; Chronic fatigue; Vitamin D deficiency; Psychophysiological insomnia; B12 deficiency; and Low testosterone in male on his pertinent problem list.  He  has a past surgical history that includes Tonsillectomy; Bariatric Surgery (12/1/04); and Jasmyne-en-y procedure (12/01/2004).  His family history includes Breast cancer in his mother; Breast cancer (age of onset: 50) in his maternal grandmother; Cancer in his father, maternal grandfather, maternal grandmother, mother, and paternal grandmother; Heart disease in his father, mother, and paternal grandmother; Hyperlipidemia in his father, maternal uncle, mother, and paternal grandmother; Hypertension in his father, maternal uncle, mother, and paternal grandmother; Lung cancer in his paternal grandmother; Prostate cancer (age of onset: 55) in his father and maternal grandfather.  He  reports that he has never smoked. He has never used smokeless tobacco. He reports that he drinks alcohol. He reports that he does not use drugs.  He has a current medication list which includes the following prescription(s): azelastine, trazodone, ketoconazole, and ketoconazole, and the following  "Facility-Administered Medications: cyanocobalamin..    Review of Systems   Constitutional: Negative for fatigue and fever.   HENT: Negative for congestion, rhinorrhea and sore throat.    Eyes: Negative for pain.   Respiratory: Negative for cough and shortness of breath.    Gastrointestinal: Negative for diarrhea and vomiting.   Skin: Positive for rash.       Answers for HPI/ROS submitted by the patient on 4/17/2018   Rash  Chronicity: recurrent  Affected locations: chest  Characteristics: redness  Exposed to: nothing  anorexia: No  facial edema: No  joint pain: No  nail changes: N        OBJECTIVE:  /78   Pulse 66   Temp 98.2 °F (36.8 °C)   Ht 188 cm (74\")   Wt 88.6 kg (195 lb 4 oz)   SpO2 97%   BMI 25.07 kg/m²      Physical Exam   Constitutional: He appears well-developed and well-nourished. No distress.   Skin: Rash noted. Rash is macular. Rash is not nodular and not vesicular.           ASSESSMENT:   Diagnosis Plan   1. Low testosterone in male  Estradiol    Testosterone, Free, Total   2. Seborrheic dermatitis  ketoconazole (NIZORAL) 2 % shampoo    ketoconazole (NIZORAL) 2 % cream   3. B12 deficiency  CBC (No Diff)    Ferritin    Iron Profile    Vitamin B12    Folate   4. Iron deficiency anemia, unspecified iron deficiency anemia type  CBC (No Diff)    Ferritin    Iron Profile    Vitamin B12    Folate   5. Gastric bypass status for obesity  CBC (No Diff)    Ferritin    Iron Profile    Vitamin B12    Comprehensive Metabolic Panel   6. Chronic fatigue  CBC (No Diff)    Ferritin    Iron Profile    Vitamin B12    Comprehensive Metabolic Panel    T4    T3, Free    TSH   7. Vitamin D deficiency  Comprehensive Metabolic Panel    Vitamin D 25 Hydroxy   8. Psychophysiological insomnia  traZODone (DESYREL) 50 MG tablet             Return in about 3 months (around 7/17/2018).    I, Elaine Torrez MD, hereby attest that the medical record entry above accurately reflects signatures/notations that I made in my " capacity as Elaine Torrez MD when I treated and diagnosed the above patient.  I do hereby attest that his information is true, accurate, and complete to the best of my knowledge and I understand that any falsification, omission, or concealment of material fact may subject me to administrative, civil, or criminal liability.

## 2018-04-18 LAB
25(OH)D3+25(OH)D2 SERPL-MCNC: <12.8 NG/ML
ALBUMIN SERPL-MCNC: 4.4 G/DL (ref 3.5–5)
ALBUMIN/GLOB SERPL: 1.6 G/DL (ref 1–2)
ALP SERPL-CCNC: 69 U/L (ref 38–126)
ALT SERPL-CCNC: 32 U/L (ref 13–69)
AST SERPL-CCNC: 22 U/L (ref 15–46)
BILIRUB SERPL-MCNC: 2.2 MG/DL (ref 0.2–1.3)
BUN SERPL-MCNC: 9 MG/DL (ref 7–20)
BUN/CREAT SERPL: 12.9 (ref 6.3–21.9)
CALCIUM SERPL-MCNC: 10 MG/DL (ref 8.4–10.2)
CHLORIDE SERPL-SCNC: 101 MMOL/L (ref 98–107)
CO2 SERPL-SCNC: 30 MMOL/L (ref 26–30)
CREAT SERPL-MCNC: 0.7 MG/DL (ref 0.6–1.3)
ERYTHROCYTE [DISTWIDTH] IN BLOOD BY AUTOMATED COUNT: 12.6 % (ref 11.5–14.5)
ESTRADIOL SERPL-MCNC: 41.3 PG/ML (ref 7.6–42.6)
FERRITIN SERPL-MCNC: 32.1 NG/ML (ref 17.9–464)
FOLATE SERPL-MCNC: 9.37 NG/ML
GFR SERPLBLD CREATININE-BSD FMLA CKD-EPI: 126 ML/MIN/1.73
GFR SERPLBLD CREATININE-BSD FMLA CKD-EPI: >150 ML/MIN/1.73
GLOBULIN SER CALC-MCNC: 2.7 GM/DL
GLUCOSE SERPL-MCNC: 88 MG/DL (ref 74–98)
HCT VFR BLD AUTO: 45.7 % (ref 42–52)
HGB BLD-MCNC: 15.5 G/DL (ref 14–18)
IRON SATN MFR SERPL: 48 % (ref 11–46)
IRON SERPL-MCNC: 175 MCG/DL (ref 37–181)
MCH RBC QN AUTO: 31.4 PG (ref 27–31)
MCHC RBC AUTO-ENTMCNC: 33.9 G/DL (ref 30–37)
MCV RBC AUTO: 92.5 FL (ref 80–94)
PLATELET # BLD AUTO: 221 10*3/MM3 (ref 130–400)
POTASSIUM SERPL-SCNC: 4.4 MMOL/L (ref 3.5–5.1)
PROT SERPL-MCNC: 7.1 G/DL (ref 6.3–8.2)
RBC # BLD AUTO: 4.94 10*6/MM3 (ref 4.7–6.1)
SODIUM SERPL-SCNC: 142 MMOL/L (ref 137–145)
T3FREE SERPL-MCNC: 3.4 PG/ML (ref 2–4.4)
T4 SERPL-MCNC: 6 UG/DL (ref 4.5–12)
TESTOST FREE SERPL-MCNC: 11.7 PG/ML (ref 8.7–25.1)
TESTOST SERPL-MCNC: 680 NG/DL (ref 264–916)
TIBC SERPL-MCNC: 367 MCG/DL (ref 261–497)
TSH SERPL DL<=0.005 MIU/L-ACNC: 1.34 MIU/ML (ref 0.47–4.68)
UIBC SERPL-MCNC: 192 MCG/DL
VIT B12 SERPL-MCNC: 278 PG/ML (ref 239–931)
WBC # BLD AUTO: 3.92 10*3/MM3 (ref 4.8–10.8)

## 2018-05-07 ENCOUNTER — OFFICE VISIT (OUTPATIENT)
Dept: INTERNAL MEDICINE | Facility: CLINIC | Age: 38
End: 2018-05-07

## 2018-05-07 VITALS
HEART RATE: 66 BPM | BODY MASS INDEX: 25.07 KG/M2 | SYSTOLIC BLOOD PRESSURE: 100 MMHG | WEIGHT: 195.38 LBS | DIASTOLIC BLOOD PRESSURE: 70 MMHG | HEIGHT: 74 IN | OXYGEN SATURATION: 97 % | TEMPERATURE: 98.1 F

## 2018-05-07 DIAGNOSIS — E55.9 VITAMIN D DEFICIENCY: ICD-10-CM

## 2018-05-07 DIAGNOSIS — R09.89 SENSATION, CHOKING: ICD-10-CM

## 2018-05-07 DIAGNOSIS — Z98.84 GASTRIC BYPASS STATUS FOR OBESITY: ICD-10-CM

## 2018-05-07 DIAGNOSIS — E28.0 ESTROGEN INCREASED: ICD-10-CM

## 2018-05-07 DIAGNOSIS — E53.8 B12 DEFICIENCY: Primary | ICD-10-CM

## 2018-05-07 DIAGNOSIS — R13.10 FOOD STICKS ON SWALLOWING: ICD-10-CM

## 2018-05-07 PROCEDURE — 96372 THER/PROPH/DIAG INJ SC/IM: CPT | Performed by: FAMILY MEDICINE

## 2018-05-07 PROCEDURE — 99214 OFFICE O/P EST MOD 30 MIN: CPT | Performed by: FAMILY MEDICINE

## 2018-05-07 RX ORDER — ERGOCALCIFEROL 1.25 MG/1
50000 CAPSULE ORAL WEEKLY
Qty: 4 CAPSULE | Refills: 2 | Status: SHIPPED | OUTPATIENT
Start: 2018-05-07 | End: 2018-07-27 | Stop reason: SDUPTHER

## 2018-05-07 RX ORDER — ANASTROZOLE 1 MG/1
1 TABLET ORAL WEEKLY
Qty: 4 TABLET | Refills: 3 | Status: SHIPPED | OUTPATIENT
Start: 2018-05-07 | End: 2018-06-19 | Stop reason: SDUPTHER

## 2018-05-07 RX ORDER — CYANOCOBALAMIN 1000 UG/ML
1000 INJECTION, SOLUTION INTRAMUSCULAR; SUBCUTANEOUS
Status: SHIPPED | OUTPATIENT
Start: 2018-05-07

## 2018-05-07 RX ADMIN — CYANOCOBALAMIN 1000 MCG: 1000 INJECTION, SOLUTION INTRAMUSCULAR; SUBCUTANEOUS at 13:06

## 2018-05-24 ENCOUNTER — HOSPITAL ENCOUNTER (OUTPATIENT)
Dept: GENERAL RADIOLOGY | Facility: HOSPITAL | Age: 38
Discharge: HOME | End: 2018-05-24
Admitting: FAMILY MEDICINE

## 2018-05-24 DIAGNOSIS — Z98.84 GASTRIC BYPASS STATUS FOR OBESITY: ICD-10-CM

## 2018-05-24 DIAGNOSIS — R13.10 FOOD STICKS ON SWALLOWING: ICD-10-CM

## 2018-05-24 DIAGNOSIS — R09.89 SENSATION, CHOKING: ICD-10-CM

## 2018-05-24 PROCEDURE — 74220 X-RAY XM ESOPHAGUS 1CNTRST: CPT

## 2018-06-17 DIAGNOSIS — K22.2 GERD WITH STRICTURE: ICD-10-CM

## 2018-06-17 DIAGNOSIS — R13.19 ESOPHAGEAL DYSPHAGIA: ICD-10-CM

## 2018-06-17 DIAGNOSIS — K22.2 ESOPHAGEAL STRICTURE: ICD-10-CM

## 2018-06-17 DIAGNOSIS — Z98.84 GASTRIC BYPASS STATUS FOR OBESITY: Primary | ICD-10-CM

## 2018-06-17 DIAGNOSIS — K21.9 GERD WITH STRICTURE: ICD-10-CM

## 2018-06-18 ENCOUNTER — OFFICE VISIT (OUTPATIENT)
Dept: INTERNAL MEDICINE | Facility: CLINIC | Age: 38
End: 2018-06-18

## 2018-06-18 VITALS
HEART RATE: 58 BPM | RESPIRATION RATE: 12 BRPM | WEIGHT: 202 LBS | OXYGEN SATURATION: 99 % | BODY MASS INDEX: 25.93 KG/M2 | SYSTOLIC BLOOD PRESSURE: 110 MMHG | TEMPERATURE: 98.3 F | HEIGHT: 74 IN | DIASTOLIC BLOOD PRESSURE: 74 MMHG

## 2018-06-18 DIAGNOSIS — E55.9 VITAMIN D DEFICIENCY: ICD-10-CM

## 2018-06-18 DIAGNOSIS — Z98.84 GASTRIC BYPASS STATUS FOR OBESITY: Primary | ICD-10-CM

## 2018-06-18 DIAGNOSIS — D50.9 IRON DEFICIENCY ANEMIA, UNSPECIFIED IRON DEFICIENCY ANEMIA TYPE: ICD-10-CM

## 2018-06-18 DIAGNOSIS — E28.0 ESTROGEN INCREASED: ICD-10-CM

## 2018-06-18 PROCEDURE — 99213 OFFICE O/P EST LOW 20 MIN: CPT | Performed by: FAMILY MEDICINE

## 2018-06-18 PROCEDURE — 96372 THER/PROPH/DIAG INJ SC/IM: CPT | Performed by: FAMILY MEDICINE

## 2018-06-18 RX ADMIN — CYANOCOBALAMIN 1000 MCG: 1000 INJECTION, SOLUTION INTRAMUSCULAR; SUBCUTANEOUS at 13:23

## 2018-06-19 LAB
25(OH)D3+25(OH)D2 SERPL-MCNC: 28.4 NG/ML
ERYTHROCYTE [DISTWIDTH] IN BLOOD BY AUTOMATED COUNT: 13.1 % (ref 11.5–14.5)
ESTRADIOL SERPL-MCNC: 24.7 PG/ML (ref 7.6–42.6)
FERRITIN SERPL-MCNC: 14.7 NG/ML (ref 17.9–464)
HCT VFR BLD AUTO: 45.3 % (ref 42–52)
HGB BLD-MCNC: 15.4 G/DL (ref 14–18)
IRON SATN MFR SERPL: 34 % (ref 11–46)
IRON SERPL-MCNC: 123 MCG/DL (ref 37–181)
MCH RBC QN AUTO: 31.8 PG (ref 27–31)
MCHC RBC AUTO-ENTMCNC: 34 G/DL (ref 30–37)
MCV RBC AUTO: 93.4 FL (ref 80–94)
PLATELET # BLD AUTO: 220 10*3/MM3 (ref 130–400)
RBC # BLD AUTO: 4.85 10*6/MM3 (ref 4.7–6.1)
TIBC SERPL-MCNC: 362 MCG/DL (ref 261–497)
UIBC SERPL-MCNC: 239 MCG/DL
WBC # BLD AUTO: 4.43 10*3/MM3 (ref 4.8–10.8)

## 2018-06-19 RX ORDER — ANASTROZOLE 1 MG/1
1 TABLET ORAL 2 TIMES WEEKLY
Qty: 8 TABLET | Refills: 3 | Status: SHIPPED | OUTPATIENT
Start: 2018-06-21 | End: 2018-06-29 | Stop reason: SDUPTHER

## 2018-06-27 ENCOUNTER — PROCEDURE VISIT (OUTPATIENT)
Dept: INTERNAL MEDICINE | Facility: CLINIC | Age: 38
End: 2018-06-27

## 2018-06-27 VITALS
DIASTOLIC BLOOD PRESSURE: 70 MMHG | WEIGHT: 203 LBS | OXYGEN SATURATION: 98 % | TEMPERATURE: 98.5 F | HEART RATE: 65 BPM | BODY MASS INDEX: 26.05 KG/M2 | SYSTOLIC BLOOD PRESSURE: 120 MMHG | HEIGHT: 74 IN

## 2018-06-27 DIAGNOSIS — B07.0 PLANTAR WARTS: ICD-10-CM

## 2018-06-27 DIAGNOSIS — B07.0 PLANTAR WART OF LEFT FOOT: Primary | ICD-10-CM

## 2018-06-27 PROCEDURE — 11305 SHAVE SKIN LESION 0.5 CM/<: CPT | Performed by: FAMILY MEDICINE

## 2018-06-27 PROCEDURE — 17110 DESTRUCTION B9 LES UP TO 14: CPT | Performed by: FAMILY MEDICINE

## 2018-06-28 ENCOUNTER — OUTSIDE FACILITY SERVICE (OUTPATIENT)
Dept: GASTROENTEROLOGY | Facility: CLINIC | Age: 38
End: 2018-06-28

## 2018-06-28 ENCOUNTER — LAB REQUISITION (OUTPATIENT)
Dept: LAB | Facility: HOSPITAL | Age: 38
End: 2018-06-28

## 2018-06-28 DIAGNOSIS — R13.14 DYSPHAGIA, PHARYNGOESOPHAGEAL PHASE: ICD-10-CM

## 2018-06-28 DIAGNOSIS — K22.2 ESOPHAGEAL OBSTRUCTION: ICD-10-CM

## 2018-06-28 PROCEDURE — 88305 TISSUE EXAM BY PATHOLOGIST: CPT | Performed by: INTERNAL MEDICINE

## 2018-06-28 PROCEDURE — 43239 EGD BIOPSY SINGLE/MULTIPLE: CPT | Performed by: INTERNAL MEDICINE

## 2018-06-28 PROCEDURE — G0500 MOD SEDAT ENDO SERVICE >5YRS: HCPCS | Performed by: INTERNAL MEDICINE

## 2018-06-29 DIAGNOSIS — E28.0 ESTROGEN INCREASED: ICD-10-CM

## 2018-06-29 LAB
CYTO UR: NORMAL
LAB AP CASE REPORT: NORMAL
LAB AP CLINICAL INFORMATION: NORMAL
PATH REPORT.FINAL DX SPEC: NORMAL
PATH REPORT.GROSS SPEC: NORMAL

## 2018-06-29 RX ORDER — ANASTROZOLE 1 MG/1
1 TABLET ORAL 2 TIMES WEEKLY
Qty: 8 TABLET | Refills: 3 | Status: SHIPPED | OUTPATIENT
Start: 2018-07-02 | End: 2018-12-26 | Stop reason: SDUPTHER

## 2018-07-02 ENCOUNTER — TELEPHONE (OUTPATIENT)
Dept: GASTROENTEROLOGY | Facility: CLINIC | Age: 38
End: 2018-07-02

## 2018-07-02 NOTE — TELEPHONE ENCOUNTER
----- Message from Monty Sheikh MD sent at 6/30/2018 12:18 PM EDT -----  Please call Jose Martin and inform him all of his biopsies are negative. Dr. Sheikh

## 2018-07-23 NOTE — PROGRESS NOTES
Subjective   Jose Martin Granda is a 38 y.o. male who needs treatment of warts because they are on the bottom of his foot and hurt every time he takes a step.     Objective    Skin: plantar wart(s) noted on left foor. Size range is 0.5 cm and 0.7 cm     Assessment/Plan   Warts (Verruca Vulgaris)     1. Plantar wart callus shaved to expose bottom of warts.    2. Liquid nitrogen was applied to larger wart(s) on underside of pinky toe for 10 second freeze/thaw cycles.  3. The patient will return at 2-4 week intervals for retreatment's as needed.

## 2018-07-27 DIAGNOSIS — E55.9 VITAMIN D DEFICIENCY: ICD-10-CM

## 2018-07-27 RX ORDER — ERGOCALCIFEROL 1.25 MG/1
CAPSULE ORAL
Qty: 4 CAPSULE | Refills: 0 | Status: SHIPPED | OUTPATIENT
Start: 2018-07-27 | End: 2018-08-30 | Stop reason: SDUPTHER

## 2018-08-01 ENCOUNTER — OFFICE VISIT (OUTPATIENT)
Dept: INTERNAL MEDICINE | Facility: CLINIC | Age: 38
End: 2018-08-01

## 2018-08-01 VITALS
HEART RATE: 83 BPM | SYSTOLIC BLOOD PRESSURE: 120 MMHG | DIASTOLIC BLOOD PRESSURE: 70 MMHG | OXYGEN SATURATION: 97 % | HEIGHT: 74 IN | BODY MASS INDEX: 25.6 KG/M2 | TEMPERATURE: 98 F | WEIGHT: 199.5 LBS

## 2018-08-01 DIAGNOSIS — H01.9 INFECTED EYE LID: Primary | ICD-10-CM

## 2018-08-01 DIAGNOSIS — E53.8 B12 DEFICIENCY: ICD-10-CM

## 2018-08-01 PROCEDURE — 96372 THER/PROPH/DIAG INJ SC/IM: CPT | Performed by: FAMILY MEDICINE

## 2018-08-01 PROCEDURE — 99213 OFFICE O/P EST LOW 20 MIN: CPT | Performed by: FAMILY MEDICINE

## 2018-08-01 RX ORDER — OFLOXACIN 3 MG/ML
1 SOLUTION/ DROPS OPHTHALMIC 4 TIMES DAILY
Qty: 5 ML | Refills: 0 | Status: SHIPPED | OUTPATIENT
Start: 2018-08-01

## 2018-08-01 RX ADMIN — CYANOCOBALAMIN 1000 MCG: 1000 INJECTION, SOLUTION INTRAMUSCULAR; SUBCUTANEOUS at 13:25

## 2018-08-01 NOTE — PROGRESS NOTES
"Subjective    Jose Martin Granda is a 38 y.o. male here for:  Chief Complaint   Patient presents with   • Eye Problem     Complaints of Right eye infection. Patient states that this has been on going for about a month now. He states that this started on the lower eyelid and has spread to the upper eyelid     History of Present Illness     Red spot on bottom lid of right eye. Wife was able to squeeze out white milk. Bump has been present for a month. Warm compresses helped, it popped, and went away. In last two days it's returned with increased redness around the eye.    Due for his monthly vitamin B12 shot today as well. S/p gastric bypass.    The following portions of the patient's history were reviewed and updated as appropriate: allergies, current medications, past family history, past medical history, past social history, past surgical history and problem list.    Review of Systems   Eyes: Positive for pain and redness. Negative for blurred vision.       Vitals:    08/01/18 1257   BP: 120/70   Pulse: 83   Temp: 98 °F (36.7 °C)   SpO2: 97%   Weight: 90.5 kg (199 lb 8 oz)   Height: 188 cm (74.02\")         Objective   Physical Exam   Constitutional: He is oriented to person, place, and time. Vital signs are normal. He appears well-developed and well-nourished. He is active.  Non-toxic appearance. He does not appear ill. No distress.   HENT:   Head: Normocephalic and atraumatic.   Eyes: EOM are normal. Right eye exhibits no chemosis. Left eye exhibits no chemosis. Right conjunctiva has no hemorrhage. Left conjunctiva has no hemorrhage. No scleral icterus.       Neck: Neck supple.   Pulmonary/Chest: Effort normal.   Neurological: He is alert and oriented to person, place, and time. No cranial nerve deficit.   Skin: Skin is warm. He is not diaphoretic.   Psychiatric: He has a normal mood and affect. His behavior is normal.   Nursing note and vitals reviewed.        Assessment/Plan     Problem List Items Addressed This Visit  "       Digestive    B12 deficiency    Current Assessment & Plan     Continue monthly injections. S/p Jasmyne-en-Y gastric bypass.           Other Visit Diagnoses     Infected eye lid    -  Primary    Relevant Medications    ofloxacin (OCUFLOX) 0.3 % ophthalmic solution    Other Relevant Orders    Ambulatory Referral to Ophthalmology          ·     Return for As Needed.    Ivy Russell MD    Please note that portions of this note may have been completed with a voice recognition program. Efforts were made to edit dictation, but occasionally words are mistranscribed.

## 2018-08-30 DIAGNOSIS — E55.9 VITAMIN D DEFICIENCY: ICD-10-CM

## 2018-08-30 RX ORDER — ERGOCALCIFEROL 1.25 MG/1
50000 CAPSULE ORAL WEEKLY
Qty: 4 CAPSULE | Refills: 2 | Status: SHIPPED | OUTPATIENT
Start: 2018-08-30 | End: 2018-12-02 | Stop reason: SDUPTHER

## 2018-12-02 DIAGNOSIS — E55.9 VITAMIN D DEFICIENCY: ICD-10-CM

## 2018-12-03 RX ORDER — ERGOCALCIFEROL 1.25 MG/1
CAPSULE ORAL
Qty: 4 CAPSULE | Refills: 0 | Status: SHIPPED | OUTPATIENT
Start: 2018-12-03 | End: 2018-12-26 | Stop reason: SDUPTHER

## 2018-12-26 DIAGNOSIS — E28.0 ESTROGEN INCREASED: ICD-10-CM

## 2018-12-26 DIAGNOSIS — E55.9 VITAMIN D DEFICIENCY: ICD-10-CM

## 2018-12-26 RX ORDER — ANASTROZOLE 1 MG/1
1 TABLET ORAL 2 TIMES WEEKLY
Qty: 8 TABLET | Refills: 3 | Status: SHIPPED | OUTPATIENT
Start: 2018-12-27 | End: 2019-04-10 | Stop reason: SDUPTHER

## 2018-12-26 RX ORDER — ERGOCALCIFEROL 1.25 MG/1
CAPSULE ORAL
Qty: 4 CAPSULE | Refills: 0 | Status: SHIPPED | OUTPATIENT
Start: 2018-12-26 | End: 2019-03-24 | Stop reason: SDUPTHER

## 2019-02-22 ENCOUNTER — OFFICE VISIT (OUTPATIENT)
Dept: ORTHOPEDIC SURGERY | Facility: CLINIC | Age: 39
End: 2019-02-22

## 2019-02-22 VITALS — RESPIRATION RATE: 18 BRPM | HEIGHT: 74 IN | WEIGHT: 199 LBS | BODY MASS INDEX: 25.54 KG/M2

## 2019-02-22 DIAGNOSIS — S93.492A SPRAIN OF ANTERIOR TALOFIBULAR LIGAMENT OF LEFT ANKLE, INITIAL ENCOUNTER: Primary | ICD-10-CM

## 2019-02-22 PROCEDURE — 99203 OFFICE O/P NEW LOW 30 MIN: CPT | Performed by: PODIATRIST

## 2019-02-22 RX ORDER — HYDROCODONE BITARTRATE AND ACETAMINOPHEN 5; 325 MG/1; MG/1
TABLET ORAL
Refills: 0 | COMMUNITY
Start: 2019-02-20

## 2019-02-22 NOTE — PROGRESS NOTES
Subjective   Patient ID: Jose Martin Granad is a 39 y.o. male   Pain of the Left Ankle (DOI: 02/19/19. Patient was walking down steps and missed a step)  Comes in today with a chief complaint of left foot and ankle pain.  3 days ago he was walking down steps and missed the bottom step and says his toes went down and pointed downward and and instead of coming down on his heel his foot rolled over.  He was seen in the emergency department in [place].  He was placed in a splint and given crutches.    History of Present Illness    Pain Score: 4  Pain Location: Ankle  Pain Orientation: Left     Pain Descriptors: Aching, Throbbing  Pain Frequency: Constant/continuous  Pain Onset: Sudden  Date Pain First Started: 02/19/19  Clinical Progression: Not changed  Aggravating Factors: Walking, Exercise, Standing        Pain Intervention(s): Home medication, Cold pack, Splinting  Result of Injury: Yes(stepped off bottom step long)  Work-Related Injury: No    Review of Systems   Constitutional: Negative for diaphoresis, fever and unexpected weight change.   HENT: Negative for dental problem and sore throat.    Eyes: Negative for visual disturbance.   Respiratory: Negative for shortness of breath.    Cardiovascular: Negative for chest pain.   Gastrointestinal: Negative for abdominal pain, constipation, diarrhea, nausea and vomiting.   Genitourinary: Negative for difficulty urinating and frequency.   Musculoskeletal: Positive for arthralgias.   Neurological: Negative for headaches.   Hematological: Does not bruise/bleed easily.       Past Medical History:   Diagnosis Date   • Allergic 12/4/04    Aspirin and anti-inflammatories   • Erectile dysfunction    • Essential hypertension     not now   • Hyperlipidemia    • Iron deficiency anemia     gastric by pass        Past Surgical History:   Procedure Laterality Date   • KIMBERLY-EN-Y PROCEDURE  12/01/2004   • TONSILLECTOMY      When I was a kid       Allergies   Allergen Reactions   •  Anti-Inflammatory Enzyme [Nutritional Supplements]      Anti inflammatory meds     • Aspirin          Current Outpatient Medications:   •  anastrozole (ARIMIDEX) 1 MG tablet, Take 1 tablet by mouth 2 (Two) Times a Week., Disp: 8 tablet, Rfl: 3  •  HYDROcodone-acetaminophen (NORCO) 5-325 MG per tablet, TAKE 1 T PO Q 6 H PRN PAIN, Disp: , Rfl: 0  •  ketoconazole (NIZORAL) 2 % shampoo, Apply  topically 2 (Two) Times a Week., Disp: 120 mL, Rfl: 2  •  ofloxacin (OCUFLOX) 0.3 % ophthalmic solution, Apply 1 drop to eye(s) as directed by provider 4 (Four) Times a Day., Disp: 5 mL, Rfl: 0  •  traZODone (DESYREL) 50 MG tablet, Take 1 or 2 tablets each night, Disp: 180 tablet, Rfl: 3  •  vitamin D (ERGOCALCIFEROL) 61580 units capsule capsule, TAKE 1 CAPSULE BY MOUTH 1 TIME EVERY WEEK, Disp: 4 capsule, Rfl: 0    Current Facility-Administered Medications:   •  cyanocobalamin injection 1,000 mcg, 1,000 mcg, Intramuscular, Q28 Days, Elaine Torrez MD, 1,000 mcg at 08/01/18 1325    Family History   Problem Relation Age of Onset   • Cancer Mother    • Breast cancer Mother    • Hyperlipidemia Mother    • Hypertension Mother    • Heart disease Mother    • Cancer Maternal Grandmother    • Breast cancer Maternal Grandmother 50   • Cancer Maternal Grandfather    • Prostate cancer Maternal Grandfather 55   • Prostate cancer Father 55   • Cancer Father    • Heart disease Father    • Hyperlipidemia Father    • Hypertension Father    • Hypertension Maternal Uncle    • Hyperlipidemia Maternal Uncle    • Lung cancer Paternal Grandmother    • Cancer Paternal Grandmother    • Hypertension Paternal Grandmother    • Hyperlipidemia Paternal Grandmother    • Heart disease Paternal Grandmother        Social History     Socioeconomic History   • Marital status: Single     Spouse name: Not on file   • Number of children: Not on file   • Years of education: Not on file   • Highest education level: Not on file   Social Needs   • Financial resource  strain: Not on file   • Food insecurity - worry: Not on file   • Food insecurity - inability: Not on file   • Transportation needs - medical: Not on file   • Transportation needs - non-medical: Not on file   Occupational History   • Not on file   Tobacco Use   • Smoking status: Never Smoker   • Smokeless tobacco: Never Used   Substance and Sexual Activity   • Alcohol use: Yes     Comment: 3 a day   • Drug use: No   • Sexual activity: Defer   Other Topics Concern   • Not on file   Social History Narrative   • Not on file       Counseling given: No       I have reviewed all of the above social hx, family hx, surgical hx, medications, allergies & ROS and confirm that it is accurate.  Objective   Physical Exam   Constitutional: He is oriented to person, place, and time. He appears well-developed and well-nourished.   HENT:   Head: Normocephalic and atraumatic.   Nose: Nose normal.   Eyes: Conjunctivae and EOM are normal. Pupils are equal, round, and reactive to light.   Neck: Normal range of motion.   Cardiovascular: Normal rate.   Pulmonary/Chest: Effort normal.   Neurological: He is alert and oriented to person, place, and time.   Skin: Skin is warm.   Psychiatric: He has a normal mood and affect. His behavior is normal. Judgment and thought content normal.   Nursing note and vitals reviewed.    Ortho Exam  [unfilled]left  Lower extremity exam:  Vascular: Pulses palpable, pedal hair noted, edema noted globally to the ankle and lateral rear foot  Neuro: Gross and protective sensation intact.  Derm: Normal turgor and temperature.  There is bruising and ecchymosis noted globally about the ankle but especially along the course of the peroneal tendons  MSK: He is able to actively plantarflex and dorsiflex all digits as well as the ankle.  He's tender to palpation along the medial ankle and deltoids.  There is pain across the anterior joint line.  There is significant pain about the entire lateral ankle, ATFL, CFL,  peroneal tendons    Assessment/Plan left ankle sprain, deltoid ligament sprain, peroneal tendon injury  Independent Review of Radiographic Studies:    Review x-rays of the left ankle from outside facility reviewed today.  No comparison films available.  X-rays show no acute fracture injury.  There do appear to be too small bony ossicles within the distal portion of the lateral joint line but these are fairly well circumscribed and not typical of fracture fragments.  Ankle joint is symmetric.  Laboratory and Other Studies:     Medical Decision Making:        Procedures  [] No procedures were performed in office today.    Jose Martin was seen today for pain.    Diagnoses and all orders for this visit:    Sprain of anterior talofibular ligament of left ankle, initial encounter          Recommendations/Plan:  I reviewed his x-rays and discussed all the soft tissue injuries with him.  I did advise him sometimes ligamentous and tendon damage can be worse than actual fractures but we'll monitor this to ensure it heals appropriately and timely.  He is given a boot and may weight-bear as tolerated with the boot.  Can discharge use of the crutches when he feels stable and that he can do so without pain.  I recommend ice, elevation, compression.  He cannot take anti-inflammatories due to GI issues.  Follow-up in 2 weeks.    Return in about 2 weeks (around 3/8/2019).  Patient agreeable to call or return sooner for any concerns.

## 2019-03-12 ENCOUNTER — OFFICE VISIT (OUTPATIENT)
Dept: ORTHOPEDIC SURGERY | Facility: CLINIC | Age: 39
End: 2019-03-12

## 2019-03-12 VITALS — RESPIRATION RATE: 18 BRPM | HEIGHT: 74 IN | BODY MASS INDEX: 25.67 KG/M2 | WEIGHT: 200 LBS

## 2019-03-12 DIAGNOSIS — S93.492A SPRAIN OF ANTERIOR TALOFIBULAR LIGAMENT OF LEFT ANKLE, INITIAL ENCOUNTER: Primary | ICD-10-CM

## 2019-03-12 DIAGNOSIS — M76.72 PERONEAL TENDINITIS OF LEFT LOWER EXTREMITY: ICD-10-CM

## 2019-03-12 PROCEDURE — 99213 OFFICE O/P EST LOW 20 MIN: CPT | Performed by: PODIATRIST

## 2019-03-12 NOTE — PROGRESS NOTES
Subjective   Patient ID: Jose Martin Granda is a 39 y.o. male   Follow-up of the Left Ankle (Sprain of anterior talofibular ligament )  Patient comes back in for follow-up today on his left ankle.  Says he has been wearing his boot and compression sleeve religiously.  Says it is much better than it was.    History of Present Illness                                                   Review of Systems   Constitutional: Negative for diaphoresis, fever and unexpected weight change.   HENT: Negative for dental problem and sore throat.    Eyes: Negative for visual disturbance.   Respiratory: Negative for shortness of breath.    Cardiovascular: Negative for chest pain.   Gastrointestinal: Negative for abdominal pain, constipation, diarrhea, nausea and vomiting.   Genitourinary: Negative for difficulty urinating and frequency.   Musculoskeletal: Positive for arthralgias.   Neurological: Negative for headaches.   Hematological: Does not bruise/bleed easily.       Past Medical History:   Diagnosis Date   • Allergic 12/4/04    Aspirin and anti-inflammatories   • Erectile dysfunction    • Essential hypertension     not now   • Hyperlipidemia    • Iron deficiency anemia     gastric by pass        Past Surgical History:   Procedure Laterality Date   • KIMBERLY-EN-Y PROCEDURE  12/01/2004   • TONSILLECTOMY      When I was a kid       Allergies   Allergen Reactions   • Anti-Inflammatory Enzyme [Nutritional Supplements]      Anti inflammatory meds     • Aspirin          Current Outpatient Medications:   •  anastrozole (ARIMIDEX) 1 MG tablet, Take 1 tablet by mouth 2 (Two) Times a Week., Disp: 8 tablet, Rfl: 3  •  HYDROcodone-acetaminophen (NORCO) 5-325 MG per tablet, TAKE 1 T PO Q 6 H PRN PAIN, Disp: , Rfl: 0  •  ketoconazole (NIZORAL) 2 % shampoo, Apply  topically 2 (Two) Times a Week., Disp: 120 mL, Rfl: 2  •  ofloxacin (OCUFLOX) 0.3 % ophthalmic solution, Apply 1 drop to eye(s) as directed by provider 4 (Four) Times a Day., Disp: 5 mL, Rfl:  0  •  traZODone (DESYREL) 50 MG tablet, Take 1 or 2 tablets each night, Disp: 180 tablet, Rfl: 3  •  vitamin D (ERGOCALCIFEROL) 27997 units capsule capsule, TAKE 1 CAPSULE BY MOUTH 1 TIME EVERY WEEK, Disp: 4 capsule, Rfl: 0    Current Facility-Administered Medications:   •  cyanocobalamin injection 1,000 mcg, 1,000 mcg, Intramuscular, Q28 Days, Elaine Torrez MD, 1,000 mcg at 08/01/18 1325    Family History   Problem Relation Age of Onset   • Cancer Mother    • Breast cancer Mother    • Hyperlipidemia Mother    • Hypertension Mother    • Heart disease Mother    • Cancer Maternal Grandmother    • Breast cancer Maternal Grandmother 50   • Cancer Maternal Grandfather    • Prostate cancer Maternal Grandfather 55   • Prostate cancer Father 55   • Cancer Father    • Heart disease Father    • Hyperlipidemia Father    • Hypertension Father    • Hypertension Maternal Uncle    • Hyperlipidemia Maternal Uncle    • Lung cancer Paternal Grandmother    • Cancer Paternal Grandmother    • Hypertension Paternal Grandmother    • Hyperlipidemia Paternal Grandmother    • Heart disease Paternal Grandmother        Social History     Socioeconomic History   • Marital status: Single     Spouse name: Not on file   • Number of children: Not on file   • Years of education: Not on file   • Highest education level: Not on file   Social Needs   • Financial resource strain: Not on file   • Food insecurity - worry: Not on file   • Food insecurity - inability: Not on file   • Transportation needs - medical: Not on file   • Transportation needs - non-medical: Not on file   Occupational History   • Not on file   Tobacco Use   • Smoking status: Never Smoker   • Smokeless tobacco: Never Used   Substance and Sexual Activity   • Alcohol use: Yes     Comment: 3 a day   • Drug use: No   • Sexual activity: Defer   Other Topics Concern   • Not on file   Social History Narrative   • Not on file       Counseling given: No       I have reviewed all of the  above social hx, family hx, surgical hx, medications, allergies & ROS and confirm that it is accurate.  Objective   Physical Exam   Constitutional: He is oriented to person, place, and time. He appears well-developed and well-nourished.   HENT:   Head: Normocephalic and atraumatic.   Nose: Nose normal.   Eyes: Conjunctivae and EOM are normal. Pupils are equal, round, and reactive to light.   Neck: Normal range of motion.   Cardiovascular: Normal rate.   Pulmonary/Chest: Effort normal.   Neurological: He is alert and oriented to person, place, and time.   Skin: Skin is warm.   Psychiatric: He has a normal mood and affect. His behavior is normal. Judgment and thought content normal.   Nursing note and vitals reviewed.    Ortho Exam left  Lower extremity exam:  Vascular: Pulses palpable, pedal hair noted, CFT brisk, edema still noted to the lateral ankle  Neuro: Gross sensation intact  Derm: Normal turgor and temperature, no wounds or sores or lesions  MSK: His tenderness to palpation to the medial ankle and deltoids has improved.  He  to the lateral gutter, ATFL, CFL.  He seems to be mostly tender posterior to the fibula around the peroneal tendons.  Range of motion of the ankle is improving but still painful.        Assessment/Plan Status post left lateral ankle sprain, peroneal tendinitis  Independent Review of Radiographic Studies:      Laboratory and Other Studies:     Medical Decision Making:        Procedures  [] No procedures were performed in office today.    There are no diagnoses linked to this encounter.      Recommendations/Plan:  He will be given a home exercise program to begin today.  I am going to try transitioning him to a brace and regular shoe rather than the boot but he is to base this decision strictly of pain and how he feels.  I recommend he continue with compression ice elevation and anti-inflammatories we will have him come back in 2-3 weeks.  He knows that if he cannot tolerate  the regular shoe and brace that he should continue with the boot.  His continued peroneal tendon pain is concerning but will monitor this and if not resolved in the next few weeks MRI of the ankle.    No Follow-up on file.  Patient agreeable to call or return sooner for any concerns.

## 2019-03-24 DIAGNOSIS — E55.9 VITAMIN D DEFICIENCY: ICD-10-CM

## 2019-03-25 RX ORDER — ERGOCALCIFEROL 1.25 MG/1
CAPSULE ORAL
Qty: 4 CAPSULE | Refills: 0 | Status: SHIPPED | OUTPATIENT
Start: 2019-03-25 | End: 2019-04-22 | Stop reason: SDUPTHER

## 2019-04-01 ENCOUNTER — OFFICE VISIT (OUTPATIENT)
Dept: ORTHOPEDIC SURGERY | Facility: CLINIC | Age: 39
End: 2019-04-01

## 2019-04-01 VITALS — HEIGHT: 74 IN | RESPIRATION RATE: 15 BRPM | WEIGHT: 200 LBS | BODY MASS INDEX: 25.67 KG/M2

## 2019-04-01 DIAGNOSIS — S93.492A SPRAIN OF ANTERIOR TALOFIBULAR LIGAMENT OF LEFT ANKLE, INITIAL ENCOUNTER: Primary | ICD-10-CM

## 2019-04-01 DIAGNOSIS — M76.72 PERONEAL TENDINITIS OF LEFT LOWER EXTREMITY: ICD-10-CM

## 2019-04-01 PROCEDURE — 99213 OFFICE O/P EST LOW 20 MIN: CPT | Performed by: PODIATRIST

## 2019-04-01 NOTE — PROGRESS NOTES
Subjective   Patient ID: Jose Martin Granda is a 39 y.o. male   Follow-up of the Left Ankle (Sprain of anterior talofibular ligament of left ankle)  Returns today for evaluation on his left ankle status post sprain and injury.  He says it is feeling much better and doing better.  He is wearing normal shoes with no brace.  He says the only issue he is having is a painful popping that is very random and on occasion.  He cannot correlate this to anything in particular and he cannot duplicated on his own.    History of Present Illness                                                   Review of Systems   Constitutional: Negative for diaphoresis, fever and unexpected weight change.   HENT: Negative for dental problem and sore throat.    Eyes: Negative for visual disturbance.   Respiratory: Negative for shortness of breath.    Cardiovascular: Negative for chest pain.   Gastrointestinal: Negative for abdominal pain, constipation, diarrhea, nausea and vomiting.   Genitourinary: Negative for difficulty urinating and frequency.   Musculoskeletal: Positive for arthralgias (Pt states that left ankle is occasionally sore).   Skin: Negative.    Neurological: Negative for headaches.   Hematological: Does not bruise/bleed easily.       Past Medical History:   Diagnosis Date   • Allergic 12/4/04    Aspirin and anti-inflammatories   • Erectile dysfunction    • Essential hypertension     not now   • Hyperlipidemia    • Iron deficiency anemia     gastric by pass        Past Surgical History:   Procedure Laterality Date   • KIMBERLY-EN-Y PROCEDURE  12/01/2004   • TONSILLECTOMY      When I was a kid       Allergies   Allergen Reactions   • Anti-Inflammatory Enzyme [Nutritional Supplements]      Anti inflammatory meds     • Aspirin          Current Outpatient Medications:   •  anastrozole (ARIMIDEX) 1 MG tablet, Take 1 tablet by mouth 2 (Two) Times a Week., Disp: 8 tablet, Rfl: 3  •  HYDROcodone-acetaminophen (NORCO) 5-325 MG per tablet, TAKE 1 T PO  Q 6 H PRN PAIN, Disp: , Rfl: 0  •  ketoconazole (NIZORAL) 2 % shampoo, Apply  topically 2 (Two) Times a Week., Disp: 120 mL, Rfl: 2  •  ofloxacin (OCUFLOX) 0.3 % ophthalmic solution, Apply 1 drop to eye(s) as directed by provider 4 (Four) Times a Day., Disp: 5 mL, Rfl: 0  •  traZODone (DESYREL) 50 MG tablet, Take 1 or 2 tablets each night, Disp: 180 tablet, Rfl: 3  •  vitamin D (ERGOCALCIFEROL) 06122 units capsule capsule, TAKE 1 CAPSULE BY MOUTH 1 TIME EVERY WEEK, Disp: 4 capsule, Rfl: 0    Current Facility-Administered Medications:   •  cyanocobalamin injection 1,000 mcg, 1,000 mcg, Intramuscular, Q28 Days, Elaine Torrez MD, 1,000 mcg at 08/01/18 1325    Family History   Problem Relation Age of Onset   • Cancer Mother    • Breast cancer Mother    • Hyperlipidemia Mother    • Hypertension Mother    • Heart disease Mother    • Cancer Maternal Grandmother    • Breast cancer Maternal Grandmother 50   • Cancer Maternal Grandfather    • Prostate cancer Maternal Grandfather 55   • Prostate cancer Father 55   • Cancer Father    • Heart disease Father    • Hyperlipidemia Father    • Hypertension Father    • Hypertension Maternal Uncle    • Hyperlipidemia Maternal Uncle    • Lung cancer Paternal Grandmother    • Cancer Paternal Grandmother    • Hypertension Paternal Grandmother    • Hyperlipidemia Paternal Grandmother    • Heart disease Paternal Grandmother        Social History     Socioeconomic History   • Marital status: Single     Spouse name: Not on file   • Number of children: Not on file   • Years of education: Not on file   • Highest education level: Not on file   Tobacco Use   • Smoking status: Never Smoker   • Smokeless tobacco: Never Used   Substance and Sexual Activity   • Alcohol use: Yes     Comment: 3 a day   • Drug use: No   • Sexual activity: Defer       Counseling given: Not Answered       I have reviewed all of the above social hx, family hx, surgical hx, medications, allergies & ROS and confirm that  it is accurate.  Objective   Physical Exam   Constitutional: He is oriented to person, place, and time. He appears well-developed and well-nourished.   HENT:   Head: Normocephalic and atraumatic.   Nose: Nose normal.   Eyes: Conjunctivae and EOM are normal. Pupils are equal, round, and reactive to light.   Neck: Normal range of motion.   Cardiovascular: Normal rate.   Pulmonary/Chest: Effort normal.   Neurological: He is alert and oriented to person, place, and time.   Skin: Skin is warm.   Psychiatric: He has a normal mood and affect. His behavior is normal. Judgment and thought content normal.   Nursing note and vitals reviewed.    Ortho Exam left  Lower extremity exam:  Vascular: Pulses palpable, pedal hair noted, CFT brisk, no edema noted  Neuro: Gross sensation intact  Derm: Normal turgor and temperature, no wounds or sores or lesions  MSK: Joint range of motion normal, manual muscle testing normal, no pain to palpation, no pain with range of motion  I took him through multiple range of motion exercises including circumduction of the ankle and was unable to duplicate any popping or subluxation.  There is a slight bulge noted however which does concern me for incompetent peroneal retinaculum.  There is no significant increased inversion or eversion or anterior drawer.  Ankle seems to be stable.      Assessment/Plan Status post left lateral ankle injury, concern for possible subluxing peroneal tendons  Independent Review of Radiographic Studies:      Laboratory and Other Studies:     Medical Decision Making:        Procedures  [] No procedures were performed in office today.    There are no diagnoses linked to this encounter.      Recommendations/Plan:  I discussed with him the potential of subluxating peroneal tendons and the potential causes and variations of this injury along with treatment options.  I am going to recommend he continue with his current treatment course for the next few weeks.  If after the  next 3-4 weeks this has not completely resolved and continues to have painful popping I recommend he come back and we will MRI the ankle to evaluate the peroneal tendons and retinaculum.  I discussed sometimes this can be treated with therapy but other times this has to be repaired.    No Follow-up on file.  Patient agreeable to call or return sooner for any concerns.

## 2019-04-10 DIAGNOSIS — E28.0 ESTROGEN INCREASED: ICD-10-CM

## 2019-04-10 RX ORDER — ANASTROZOLE 1 MG/1
TABLET ORAL
Qty: 8 TABLET | Refills: 0 | Status: SHIPPED | OUTPATIENT
Start: 2019-04-10 | End: 2019-05-03 | Stop reason: SDUPTHER

## 2019-04-16 ENCOUNTER — TRANSCRIBE ORDERS (OUTPATIENT)
Dept: LAB | Facility: HOSPITAL | Age: 39
End: 2019-04-16

## 2019-04-16 ENCOUNTER — LAB (OUTPATIENT)
Dept: LAB | Facility: HOSPITAL | Age: 39
End: 2019-04-16

## 2019-04-16 DIAGNOSIS — E61.1 IRON DEFICIENCY: ICD-10-CM

## 2019-04-16 DIAGNOSIS — Z00.00 ROUTINE GENERAL MEDICAL EXAMINATION AT A HEALTH CARE FACILITY: ICD-10-CM

## 2019-04-16 DIAGNOSIS — E55.9 VITAMIN D DEFICIENCY: ICD-10-CM

## 2019-04-16 DIAGNOSIS — E29.1 TESTOSTERONE DEFICIENCY IN MALE: ICD-10-CM

## 2019-04-16 DIAGNOSIS — Z00.00 ROUTINE GENERAL MEDICAL EXAMINATION AT A HEALTH CARE FACILITY: Primary | ICD-10-CM

## 2019-04-16 LAB
ALBUMIN SERPL-MCNC: 4.9 G/DL (ref 3.5–5)
ALBUMIN/GLOB SERPL: 1.5 G/DL (ref 1–2)
ALP SERPL-CCNC: 49 U/L (ref 38–126)
ALT SERPL W P-5'-P-CCNC: 18 U/L (ref 13–69)
ANION GAP SERPL CALCULATED.3IONS-SCNC: 16.6 MMOL/L (ref 10–20)
AST SERPL-CCNC: 32 U/L (ref 15–46)
BASOPHILS # BLD AUTO: 0.03 10*3/MM3 (ref 0–0.2)
BASOPHILS NFR BLD AUTO: 0.8 % (ref 0–1.5)
BILIRUB SERPL-MCNC: 1.5 MG/DL (ref 0.2–1.3)
BUN BLD-MCNC: 10 MG/DL (ref 7–20)
BUN/CREAT SERPL: 14.3 (ref 6.3–21.9)
CALCIUM SPEC-SCNC: 9.8 MG/DL (ref 8.4–10.2)
CHLORIDE SERPL-SCNC: 99 MMOL/L (ref 98–107)
CO2 SERPL-SCNC: 29 MMOL/L (ref 26–30)
CREAT BLD-MCNC: 0.7 MG/DL (ref 0.6–1.3)
DEPRECATED RDW RBC AUTO: 46.3 FL (ref 37–54)
EOSINOPHIL # BLD AUTO: 0.07 10*3/MM3 (ref 0–0.4)
EOSINOPHIL NFR BLD AUTO: 1.9 % (ref 0.3–6.2)
ERYTHROCYTE [DISTWIDTH] IN BLOOD BY AUTOMATED COUNT: 13.2 % (ref 12.3–15.4)
FERRITIN SERPL-MCNC: 16.3 NG/ML (ref 17.9–464)
GFR SERPL CREATININE-BSD FRML MDRD: 126 ML/MIN/1.73
GLOBULIN UR ELPH-MCNC: 3.2 GM/DL
GLUCOSE BLD-MCNC: 85 MG/DL (ref 74–98)
HCT VFR BLD AUTO: 46.4 % (ref 37.5–51)
HGB BLD-MCNC: 15.7 G/DL (ref 13–17.7)
IMM GRANULOCYTES # BLD AUTO: 0.02 10*3/MM3 (ref 0–0.05)
IMM GRANULOCYTES NFR BLD AUTO: 0.5 % (ref 0–0.5)
IRON 24H UR-MRATE: 128 MCG/DL (ref 37–181)
IRON SATN MFR SERPL: 34 % (ref 11–46)
LYMPHOCYTES # BLD AUTO: 1.08 10*3/MM3 (ref 0.7–3.1)
LYMPHOCYTES NFR BLD AUTO: 29.1 % (ref 19.6–45.3)
MCH RBC QN AUTO: 32.1 PG (ref 26.6–33)
MCHC RBC AUTO-ENTMCNC: 33.8 G/DL (ref 31.5–35.7)
MCV RBC AUTO: 94.9 FL (ref 79–97)
MONOCYTES # BLD AUTO: 0.42 10*3/MM3 (ref 0.1–0.9)
MONOCYTES NFR BLD AUTO: 11.3 % (ref 5–12)
NEUTROPHILS # BLD AUTO: 2.09 10*3/MM3 (ref 1.4–7)
NEUTROPHILS NFR BLD AUTO: 56.4 % (ref 42.7–76)
NRBC BLD AUTO-RTO: 0 /100 WBC (ref 0–0)
PLATELET # BLD AUTO: 211 10*3/MM3 (ref 140–450)
PMV BLD AUTO: 9.5 FL (ref 6–12)
POTASSIUM BLD-SCNC: 4.6 MMOL/L (ref 3.5–5.1)
PROT SERPL-MCNC: 8.1 G/DL (ref 6.3–8.2)
RBC # BLD AUTO: 4.89 10*6/MM3 (ref 4.14–5.8)
SODIUM BLD-SCNC: 140 MMOL/L (ref 137–145)
TIBC SERPL-MCNC: 372 MCG/DL (ref 261–497)
WBC NRBC COR # BLD: 3.71 10*3/MM3 (ref 3.4–10.8)

## 2019-04-16 PROCEDURE — 82652 VIT D 1 25-DIHYDROXY: CPT

## 2019-04-16 PROCEDURE — 84402 ASSAY OF FREE TESTOSTERONE: CPT

## 2019-04-16 PROCEDURE — 80053 COMPREHEN METABOLIC PANEL: CPT

## 2019-04-16 PROCEDURE — 36415 COLL VENOUS BLD VENIPUNCTURE: CPT

## 2019-04-16 PROCEDURE — 83550 IRON BINDING TEST: CPT

## 2019-04-16 PROCEDURE — 84403 ASSAY OF TOTAL TESTOSTERONE: CPT

## 2019-04-16 PROCEDURE — 85025 COMPLETE CBC W/AUTO DIFF WBC: CPT

## 2019-04-16 PROCEDURE — 82728 ASSAY OF FERRITIN: CPT

## 2019-04-16 PROCEDURE — 82670 ASSAY OF TOTAL ESTRADIOL: CPT

## 2019-04-16 PROCEDURE — 83540 ASSAY OF IRON: CPT

## 2019-04-17 LAB — ESTRADIOL SERPL HS-MCNC: 30.3 PG/ML (ref 7.6–42.6)

## 2019-04-18 LAB
1,25(OH)2D3 SERPL-MCNC: 64.2 PG/ML (ref 19.9–79.3)
TESTOST FREE SERPL-MCNC: 15.2 PG/ML (ref 8.7–25.1)
TESTOST SERPL-MCNC: 712 NG/DL (ref 264–916)

## 2019-04-22 DIAGNOSIS — E55.9 VITAMIN D DEFICIENCY: ICD-10-CM

## 2019-04-22 RX ORDER — ERGOCALCIFEROL 1.25 MG/1
CAPSULE ORAL
Qty: 4 CAPSULE | Refills: 3 | Status: SHIPPED | OUTPATIENT
Start: 2019-04-22 | End: 2019-11-19 | Stop reason: SDUPTHER

## 2019-05-03 DIAGNOSIS — E28.0 ESTROGEN INCREASED: ICD-10-CM

## 2019-05-03 RX ORDER — ANASTROZOLE 1 MG/1
TABLET ORAL
Qty: 8 TABLET | Refills: 0 | Status: SHIPPED | OUTPATIENT
Start: 2019-05-03 | End: 2019-06-04 | Stop reason: SDUPTHER

## 2019-06-04 DIAGNOSIS — E28.0 ESTROGEN INCREASED: ICD-10-CM

## 2019-06-04 RX ORDER — ANASTROZOLE 1 MG/1
TABLET ORAL
Qty: 8 TABLET | Refills: 0 | Status: SHIPPED | OUTPATIENT
Start: 2019-06-04

## 2019-06-26 DIAGNOSIS — E28.0 ESTROGEN INCREASED: ICD-10-CM

## 2019-06-26 RX ORDER — ANASTROZOLE 1 MG/1
TABLET ORAL
Qty: 8 TABLET | Refills: 0 | OUTPATIENT
Start: 2019-06-26

## 2019-08-23 ENCOUNTER — LAB (OUTPATIENT)
Dept: LAB | Facility: HOSPITAL | Age: 39
End: 2019-08-23

## 2019-08-23 ENCOUNTER — TRANSCRIBE ORDERS (OUTPATIENT)
Dept: LAB | Facility: HOSPITAL | Age: 39
End: 2019-08-23

## 2019-08-23 DIAGNOSIS — E29.1 3-OXO-5 ALPHA-STEROID DELTA 4-DEHYDROGENASE DEFICIENCY: ICD-10-CM

## 2019-08-23 DIAGNOSIS — E61.1 IRON DEFICIENCY: ICD-10-CM

## 2019-08-23 DIAGNOSIS — E61.1 IRON DEFICIENCY: Primary | ICD-10-CM

## 2019-08-23 LAB
DEPRECATED RDW RBC AUTO: 48.8 FL (ref 37–54)
ERYTHROCYTE [DISTWIDTH] IN BLOOD BY AUTOMATED COUNT: 13.5 % (ref 12.3–15.4)
ESTRADIOL SERPL HS-MCNC: 32.3 PG/ML
HCT VFR BLD AUTO: 47.3 % (ref 37.5–51)
HGB BLD-MCNC: 15.3 G/DL (ref 13–17.7)
MCH RBC QN AUTO: 31.2 PG (ref 26.6–33)
MCHC RBC AUTO-ENTMCNC: 32.3 G/DL (ref 31.5–35.7)
MCV RBC AUTO: 96.3 FL (ref 79–97)
PLATELET # BLD AUTO: 226 10*3/MM3 (ref 140–450)
PMV BLD AUTO: 10.3 FL (ref 6–12)
RBC # BLD AUTO: 4.91 10*6/MM3 (ref 4.14–5.8)
WBC NRBC COR # BLD: 3.62 10*3/MM3 (ref 3.4–10.8)

## 2019-08-23 PROCEDURE — 85027 COMPLETE CBC AUTOMATED: CPT

## 2019-08-23 PROCEDURE — 82670 ASSAY OF TOTAL ESTRADIOL: CPT

## 2019-08-23 PROCEDURE — 36415 COLL VENOUS BLD VENIPUNCTURE: CPT

## 2019-08-23 PROCEDURE — 84403 ASSAY OF TOTAL TESTOSTERONE: CPT

## 2019-08-23 PROCEDURE — 84402 ASSAY OF FREE TESTOSTERONE: CPT

## 2019-08-25 LAB
TESTOST FREE SERPL-MCNC: 17.5 PG/ML (ref 8.7–25.1)
TESTOST SERPL-MCNC: 730 NG/DL (ref 264–916)

## 2019-11-19 DIAGNOSIS — E55.9 VITAMIN D DEFICIENCY: ICD-10-CM

## 2019-11-19 RX ORDER — ERGOCALCIFEROL 1.25 MG/1
CAPSULE ORAL
Qty: 4 CAPSULE | Refills: 0 | Status: SHIPPED | OUTPATIENT
Start: 2019-11-19

## 2019-12-21 DIAGNOSIS — E55.9 VITAMIN D DEFICIENCY: ICD-10-CM

## 2019-12-23 RX ORDER — ERGOCALCIFEROL 1.25 MG/1
CAPSULE ORAL
Qty: 4 CAPSULE | Refills: 0 | OUTPATIENT
Start: 2019-12-23